# Patient Record
Sex: FEMALE | Race: WHITE | Employment: OTHER | URBAN - METROPOLITAN AREA
[De-identification: names, ages, dates, MRNs, and addresses within clinical notes are randomized per-mention and may not be internally consistent; named-entity substitution may affect disease eponyms.]

---

## 2017-01-03 DIAGNOSIS — E03.9 HYPOTHYROIDISM, UNSPECIFIED TYPE: Primary | ICD-10-CM

## 2017-01-03 RX ORDER — LEVOTHYROXINE SODIUM 112 UG/1
TABLET ORAL
Qty: 1 TAB | Refills: 0
Start: 2017-01-03 | End: 2017-04-12 | Stop reason: SDUPTHER

## 2017-02-13 DIAGNOSIS — E03.9 HYPOTHYROIDISM, UNSPECIFIED TYPE: ICD-10-CM

## 2017-03-31 ENCOUNTER — OFFICE VISIT (OUTPATIENT)
Dept: INTERNAL MEDICINE CLINIC | Age: 82
End: 2017-03-31

## 2017-03-31 DIAGNOSIS — E55.9 VITAMIN D DEFICIENCY: ICD-10-CM

## 2017-03-31 DIAGNOSIS — E03.9 ACQUIRED HYPOTHYROIDISM: ICD-10-CM

## 2017-03-31 DIAGNOSIS — E11.22 CONTROLLED TYPE 2 DIABETES MELLITUS WITH STAGE 4 CHRONIC KIDNEY DISEASE, WITHOUT LONG-TERM CURRENT USE OF INSULIN (HCC): Primary | ICD-10-CM

## 2017-03-31 DIAGNOSIS — E78.00 PURE HYPERCHOLESTEROLEMIA: ICD-10-CM

## 2017-03-31 DIAGNOSIS — N18.4 CONTROLLED TYPE 2 DIABETES MELLITUS WITH STAGE 4 CHRONIC KIDNEY DISEASE, WITHOUT LONG-TERM CURRENT USE OF INSULIN (HCC): Primary | ICD-10-CM

## 2017-03-31 RX ORDER — PRAVASTATIN SODIUM 40 MG/1
40 TABLET ORAL
Qty: 90 TAB | Refills: 3 | Status: SHIPPED | OUTPATIENT
Start: 2017-03-31 | End: 2018-01-01 | Stop reason: SDUPTHER

## 2017-03-31 NOTE — MR AVS SNAPSHOT
Visit Information Date & Time Provider Department Dept. Phone Encounter #  
 3/31/2017  2:15 PM MD Julio Cesar [facility] [phone] Upcoming Health Maintenance Date Due  
 EYE EXAM RETINAL OR DILATED Q1 11/19/2016 MEDICARE YEARLY EXAM 12/22/2016 FOOT EXAM Q1 12/22/2016 HEMOGLOBIN A1C Q6M 6/28/2017 MICROALBUMIN Q1 7/13/2017 LIPID PANEL Q1 7/13/2017 GLAUCOMA SCREENING Q2Y 11/19/2017 DTaP/Tdap/Td series (2 - Td) 12/4/2024 Allergies as of 3/31/2017  Review Complete On: 3/31/2017 By: Lila Julian Severity Noted Reaction Type Reactions Lipitor [Atorvastatin]  03/05/2013   Side Effect Myalgia Current Immunizations  Reviewed on 12/22/2015 Name Date Influenza High Dose Vaccine PF 10/7/2016, 10/1/2015 Influenza Vaccine 10/20/2014 Influenza Vaccine Split 9/27/2012 Pneumococcal Conjugate (PCV-13) 4/4/2016 Pneumococcal Vaccine (Unspecified Type) 6/5/2011 Tdap 12/4/2014 Not reviewed this visit You Were Diagnosed With   
  
 Codes Comments Controlled type 2 diabetes mellitus with stage 4 chronic kidney disease, without long-term current use of insulin (HCC)    -  Primary ICD-10-CM: E11.22, N18.4 ICD-9-CM: 250.40, 585.4 Vitamin D deficiency     ICD-10-CM: E55.9 ICD-9-CM: 268.9 Pure hypercholesterolemia     ICD-10-CM: E78.00 ICD-9-CM: 272.0 Acquired hypothyroidism     ICD-10-CM: E03.9 ICD-9-CM: 928. 9 Vitals OB Status Smoking Status Postmenopausal Never Smoker Preferred Pharmacy Pharmacy Name Phone [address] Your Updated Medication List  
  
   
This list is accurate as of: 3/31/17  2:57 PM.  Always use your most recent med list. amLODIPine 10 mg tablet Commonly known as:  NORVASC  
take 1 tablet by mouth once daily aspirin delayed-release 81 mg tablet Commonly known as:  Aspirin EC Take 1 Tab by mouth daily. CALCIUM 600 + D(3) 600 mg(1,500mg) -200 unit Tab Generic drug:  calcium-vitamin D Take 1 Tab by mouth two (2) times daily (with meals). CRESTOR 20 mg tablet Generic drug:  rosuvastatin  
take 1 tablet by mouth at bedtime JANUVIA 50 mg tablet Generic drug:  SITagliptin  
take 1 tablet by mouth once daily  
  
 lisinopril 10 mg tablet Commonly known as:  PRINIVIL, ZESTRIL  
take 1 tablet by mouth once daily  
  
 pravastatin 40 mg tablet Commonly known as:  PRAVACHOL Take 1 Tab by mouth nightly. SYNTHROID 112 mcg tablet Generic drug:  levothyroxine  
take 1 tablet by mouth once daily BEFORE BREAKFAST  
  
 VITAMIN D3 2,000 unit Cap capsule Generic drug:  Cholecalciferol (Vitamin D3) Take 1 capsule by mouth daily. Prescriptions Sent to Pharmacy Refills  
 pravastatin (PRAVACHOL) 40 mg tablet 3 Sig: Take 1 Tab by mouth nightly. Class: Normal  
 Pharmacy: [address] [place] [address] #: [phone] Route: Oral  
  
We Performed the Following CBC WITH AUTOMATED DIFF [03069 CPT(R)] HEMOGLOBIN A1C WITH EAG [65543 CPT(R)] METABOLIC PANEL, COMPREHENSIVE [52122 CPT(R)] TSH REFLEX TO T4 [[id number] Custom] VITAMIN D, 25 HYDROXY [id number] CPT(R)] Introducing hospitals & HEALTH SERVICES! [facility] introduces DailyPath patient portal. Now you can access parts of your medical record, email your doctor's office, and request medication refills online. 1. In your internet browser, go to [email or web address] 2. Click on the First Time User? Click Here link in the Sign In box. You will see the New Member Sign Up page. 3. Enter your DailyPath Access Code exactly as it appears below. You will not need to use this code after youve completed the sign-up process.  If you do not sign up before the expiration date, you must request a new code. · WTFast Access Code: [email or web address] Expires: 6/8/2017  4:35 PM 
 
4. Enter the last four digits of your Social Security Number (xxxx) and Date of Birth (mm/dd/yyyy) as indicated and click Submit. You will be taken to the next sign-up page. 5. Create a WTFast ID. This will be your WTFast login ID and cannot be changed, so think of one that is secure and easy to remember. 6. Create a WTFast password. You can change your password at any time. 7. Enter your Password Reset Question and Answer. This can be used at a later time if you forget your password. 8. Enter your e-mail address. You will receive e-mail notification when new information is available in [address] 9. Click Sign Up. You can now view and download portions of your medical record. 10. Click the Download Summary menu link to download a portable copy of your medical information. If you have questions, please visit the Frequently Asked Questions section of the WTFast website. Remember, WTFast is NOT to be used for urgent needs. For medical emergencies, dial 911. Now available from your iPhone and Android! Please provide this summary of care documentation to your next provider. Your primary care clinician is listed as Shreya Hilton. If you have any questions after today's visit, please call [phone].

## 2017-03-31 NOTE — PROGRESS NOTES
Blood Pressure Check       HPI:  Lata Foster is a 80y.o. year old female who is here to establish care. She  had her medical care:  ADM    She reports the following history and medical concerns:      HTN- didn't go see cardiologist    Saw kidney specialist- everything is \"stable\"    Thyroid- increased every day. Cholesterol- doesn't remember LDL but was put on crestor 20 mg        Assessment and Plan        1. Controlled type 2 diabetes mellitus with stage 4 chronic kidney disease, without long-term current use of insulin ([facility]  Recheck E7D  states complications are from DM  - CBC WITH AUTOMATED DIFF  - HEMOGLOBIN A1C WITH EAG  - METABOLIC PANEL, COMPREHENSIVE    2. Vitamin D deficiency  Recheck Vit D.  - VITAMIN D, 25 HYDROXY    3. Pure hypercholesterolemia  Stop crestor because of kidney problems. Change to pravachol 40 mg  - pravastatin (PRAVACHOL) 40 mg tablet; Take 1 Tab by mouth nightly. Dispense: 90 Tab; Refill: 3    4. Acquired hypothyroidism  Recheck TSH  - TSH REFLEX TO T4            There were no vitals taken for this visit. Historical Data    Past Medical History:   Diagnosis Date    Chronic kidney disease (CKD), stage III (moderate)     Dr. 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 Jason Lee Left inguinal hernia     Melanoma ([facility] 2006    Osteopenia 11/25/13    Pneumonia 2005    Valvular heart disease 12/15/2016    grade 2 diastolic dysfunction, moderate to marked annular calcification of mitral valge, moderate calcification of aortic valve with mild-mod stenosis, mild TR, trivial pericardial effusion.     Vitamin D deficiency        Past Surgical History:   Procedure Laterality Date    HX CATARACT REMOVAL Right 2008    HX HYSTERECTOMY  1984    TARIQ/and unilat Ooph    [place] MELANOMA FOLLOW UP COMPLETED  2006    [facility]st Outpatient Encounter Prescriptions as of 3/31/2017   Medication Sig Dispense Refill    pravastatin (PRAVACHOL) 40 mg tablet Take 1 Tab by mouth nightly. 90 Tab 3    lisinopril (PRINIVIL, ZESTRIL) 10 mg tablet take 1 tablet by mouth once daily 30 Tab 6    amLODIPine (NORVASC) 10 mg tablet take 1 tablet by mouth once daily 30 Tab 6    CRESTOR 20 mg tablet take 1 tablet by mouth at bedtime 30 Tab 6    JANUVIA 50 mg tablet take 1 tablet by mouth once daily 30 Tab 6    SYNTHROID 112 mcg tablet take 1 tablet by mouth once daily BEFORE BREAKFAST 1 Tab 0    Cholecalciferol, Vitamin D3, (VITAMIN D3) 2,000 unit cap Take 1 capsule by mouth daily.  aspirin delayed-release (ASPIRIN EC) 81 mg tablet Take 1 Tab by mouth daily. 30 Tab 11    calcium-vitamin D (CALCIUM 600 + D,3,) 600 mg(1,500mg) -200 unit Tab Take 1 Tab by mouth two (2) times daily (with meals). No facility-administered encounter medications on file as of 3/31/2017. Allergies   Allergen Reactions    Lipitor [Atorvastatin] Myalgia        Social History     Social History    Marital status:      Spouse name: N/A    Number of children: N/A    Years of education: N/A     Occupational History    Not on file. Social History Main Topics    Smoking status: Never Smoker    Smokeless tobacco: Never Used    Alcohol use No    Drug use: No    Sexual activity: No     Other Topics Concern    Not on file     Social History Narrative        Review of Systems   Constitutional: Negative for weight loss. Eyes: Negative for blurred vision. Respiratory: Negative for shortness of breath. Cardiovascular: Negative for chest pain. Gastrointestinal: Negative for abdominal pain. Genitourinary: Negative for dysuria and frequency. Skin: Negative for rash. Neurological: Negative for dizziness, weakness and headaches. Physical Exam   Constitutional: She appears well-developed and well-nourished. She is active.   Non-toxic appearance. She does not have a sickly appearance. She does not appear ill. No distress. Eyes: Conjunctivae are normal.   Cardiovascular: Normal rate, regular rhythm, S1 normal, S2 normal, normal heart sounds and normal pulses. Exam reveals no gallop and no friction rub. Pulmonary/Chest: Effort normal and breath sounds normal. No respiratory distress. Abdominal: Soft. Bowel sounds are normal.   Musculoskeletal: She exhibits no edema or deformity. Neurological: She is alert. Skin: Skin is warm and dry. No rash noted. No pallor. Psychiatric: She has a normal mood and affect. Her behavior is normal.      Ortho Exam       Orders Placed This Encounter    TSH REFLEX TO T4    CBC WITH AUTOMATED DIFF    HEMOGLOBIN A1C WITH EAG    METABOLIC PANEL, COMPREHENSIVE    VITAMIN D, 25 HYDROXY    CKD REPORT    DIABETES PATIENT EDUCATION    pravastatin (PRAVACHOL) 40 mg tablet     Sig: Take 1 Tab by mouth nightly. Dispense:  90 Tab     Refill:  3        I have reviewed the patient's medical history in detail and updated the computerized patient record. We had a prolonged discussion about these complex clinical issues and went over the various important aspects to consider. All questions were answered. Advised her to call back or return to office if symptoms do not improve, change in nature, or persist.    She was given an after visit summary or informed of [facility] Access which includes patient instructions, diagnoses, current medications, & vitals. She expressed understanding with the diagnosis and plan.

## 2017-03-31 NOTE — PROGRESS NOTES
Chief Complaint   Patient presents with    Blood Pressure Check     1. Have you been to the ER, urgent care clinic since your last visit? No  Hospitalized since your last visit? No    2. Have you seen or consulted any other health care providers outside of the [address] [place] [address] since your last visit? No  Include any pap smears or colon screening.  No

## 2017-04-01 LAB
25(OH)D3+25(OH)D2 SERPL-MCNC: 48 NG/ML (ref 30–100)
ALBUMIN SERPL-MCNC: 4 G/DL (ref 3.5–4.7)
ALBUMIN/GLOB SERPL: 1.4 {RATIO} (ref 1.2–2.2)
ALP SERPL-CCNC: 44 IU/L (ref 39–117)
ALT SERPL-CCNC: 7 IU/L (ref 0–32)
AST SERPL-CCNC: 10 IU/L (ref 0–40)
BASOPHILS # BLD AUTO: 0.1 X10E3/UL (ref 0–0.2)
BASOPHILS NFR BLD AUTO: 1 %
BILIRUB SERPL-MCNC: 0.2 MG/DL (ref 0–1.2)
BUN SERPL-MCNC: 34 MG/DL (ref 8–27)
BUN/CREAT SERPL: 20 (ref 11–26)
CALCIUM SERPL-MCNC: 9.5 MG/DL (ref 8.7–10.3)
CHLORIDE SERPL-SCNC: 105 MMOL/L (ref 96–106)
CO2 SERPL-SCNC: 22 MMOL/L (ref 18–29)
CREAT SERPL-MCNC: 1.71 MG/DL (ref 0.57–1)
EOSINOPHIL # BLD AUTO: 0.2 X10E3/UL (ref 0–0.4)
EOSINOPHIL NFR BLD AUTO: 2 %
ERYTHROCYTE [DISTWIDTH] IN BLOOD BY AUTOMATED COUNT: 16.2 % (ref 12.3–15.4)
EST. AVERAGE GLUCOSE BLD GHB EST-MCNC: 131 MG/DL
GLOBULIN SER CALC-MCNC: 2.8 G/DL (ref 1.5–4.5)
GLUCOSE SERPL-MCNC: 109 MG/DL (ref 65–99)
HBA1C MFR BLD: 6.2 % (ref 4.8–5.6)
HCT VFR BLD AUTO: 35.1 % (ref 34–46.6)
HGB BLD-MCNC: 11.1 G/DL (ref 11.1–15.9)
IMM GRANULOCYTES # BLD: 0 X10E3/UL (ref 0–0.1)
IMM GRANULOCYTES NFR BLD: 0 %
INTERPRETATION: NORMAL
LYMPHOCYTES # BLD AUTO: 2.4 X10E3/UL (ref 0.7–3.1)
LYMPHOCYTES NFR BLD AUTO: 27 %
Lab: NORMAL
MCH RBC QN AUTO: 27.5 PG (ref 26.6–33)
MCHC RBC AUTO-ENTMCNC: 31.6 G/DL (ref 31.5–35.7)
MCV RBC AUTO: 87 FL (ref 79–97)
MONOCYTES # BLD AUTO: 0.7 X10E3/UL (ref 0.1–0.9)
MONOCYTES NFR BLD AUTO: 8 %
NEUTROPHILS # BLD AUTO: 5.6 X10E3/UL (ref 1.4–7)
NEUTROPHILS NFR BLD AUTO: 62 %
PLATELET # BLD AUTO: 191 X10E3/UL (ref 150–379)
POTASSIUM SERPL-SCNC: 4.8 MMOL/L (ref 3.5–5.2)
PROT SERPL-MCNC: 6.8 G/DL (ref 6–8.5)
RBC # BLD AUTO: 4.03 X10E6/UL (ref 3.77–5.28)
SODIUM SERPL-SCNC: 142 MMOL/L (ref 134–144)
TSH SERPL DL<=0.005 MIU/L-ACNC: 1.32 UIU/ML (ref 0.45–4.5)
WBC # BLD AUTO: 9 X10E3/UL (ref 3.4–10.8)

## 2017-04-03 VITALS
TEMPERATURE: 97 F | SYSTOLIC BLOOD PRESSURE: 126 MMHG | HEART RATE: 71 BPM | WEIGHT: 169 LBS | HEIGHT: 59 IN | DIASTOLIC BLOOD PRESSURE: 80 MMHG | OXYGEN SATURATION: 98 % | RESPIRATION RATE: 16 BRPM | BODY MASS INDEX: 34.07 KG/M2

## 2017-04-03 NOTE — PROGRESS NOTES
Pt should have a follow up in 4 weeks to recheck labs and then visit 3 days later. Her kidneys are worse from last time. Ask who her kidney doctor is and fax copy to him.

## 2017-04-11 ENCOUNTER — TELEPHONE (OUTPATIENT)
Dept: INTERNAL MEDICINE CLINIC | Age: 82
End: 2017-04-11

## 2017-04-11 NOTE — TELEPHONE ENCOUNTER
Spoke to patient's daughter Sam Gifford  and she will call back to schedule an appointment and give name nephrologist.

## 2017-04-11 NOTE — TELEPHONE ENCOUNTER
----- Message from Chriss Smith MD sent at 4/3/2017  8:10 AM EDT -----  Pt should have a follow up in 4 weeks to recheck labs and then visit 3 days later. Her kidneys are worse from last time. Ask who her kidney doctor is and fax copy to him.

## 2017-04-11 NOTE — PROGRESS NOTES
Spoke to patient daughter Fuad Thompson about lab results.   She stated she will call back to schedule an appointment and give the name and number to nephrologist.

## 2017-08-23 RX ORDER — AMLODIPINE BESYLATE 10 MG/1
TABLET ORAL
Qty: 30 TAB | Refills: 1 | Status: CANCELLED | OUTPATIENT
Start: 2017-08-23

## 2017-08-23 RX ORDER — LISINOPRIL 10 MG/1
TABLET ORAL
Qty: 30 TAB | Refills: 1 | Status: CANCELLED | OUTPATIENT
Start: 2017-08-23

## 2017-08-23 NOTE — TELEPHONE ENCOUNTER
Received fax from [facility] for Rx refill of Lisinopril 10mg, Amlodipine 10mg, and Januvia 50mg tablets.   Rx's pended to Dr. Jeronimo García for approval.

## 2017-08-24 ENCOUNTER — HOSPITAL ENCOUNTER (OUTPATIENT)
Dept: LAB | Age: 82
Discharge: HOME | End: 2017-08-24
Payer: MEDICARE

## 2017-08-24 ENCOUNTER — OFFICE VISIT (OUTPATIENT)
Dept: INTERNAL MEDICINE CLINIC | Age: 82
End: 2017-08-24

## 2017-08-24 VITALS
OXYGEN SATURATION: 98 % | HEART RATE: 64 BPM | TEMPERATURE: 96.8 F | WEIGHT: 161.2 LBS | BODY MASS INDEX: 32.5 KG/M2 | HEIGHT: 59 IN | DIASTOLIC BLOOD PRESSURE: 80 MMHG | SYSTOLIC BLOOD PRESSURE: 136 MMHG | RESPIRATION RATE: 18 BRPM

## 2017-08-24 DIAGNOSIS — E11.9 COMPREHENSIVE DIABETIC FOOT EXAMINATION, TYPE 2 DM, ENCOUNTER FOR (HCC): ICD-10-CM

## 2017-08-24 DIAGNOSIS — E78.00 HYPERCHOLESTEREMIA: ICD-10-CM

## 2017-08-24 DIAGNOSIS — Z71.89 ADVANCED CARE PLANNING/COUNSELING DISCUSSION: ICD-10-CM

## 2017-08-24 DIAGNOSIS — N18.30 CHRONIC RENAL INSUFFICIENCY, STAGE 3 (MODERATE) (HCC): ICD-10-CM

## 2017-08-24 DIAGNOSIS — E11.22 CONTROLLED TYPE 2 DIABETES MELLITUS WITH STAGE 4 CHRONIC KIDNEY DISEASE, WITHOUT LONG-TERM CURRENT USE OF INSULIN (HCC): Primary | ICD-10-CM

## 2017-08-24 DIAGNOSIS — Z00.00 MEDICARE ANNUAL WELLNESS VISIT, SUBSEQUENT: ICD-10-CM

## 2017-08-24 DIAGNOSIS — E03.9 HYPOTHYROIDISM, UNSPECIFIED TYPE: ICD-10-CM

## 2017-08-24 DIAGNOSIS — N18.4 CONTROLLED TYPE 2 DIABETES MELLITUS WITH STAGE 4 CHRONIC KIDNEY DISEASE, WITHOUT LONG-TERM CURRENT USE OF INSULIN (HCC): Primary | ICD-10-CM

## 2017-08-24 DIAGNOSIS — Z13.31 SCREENING FOR DEPRESSION: ICD-10-CM

## 2017-08-24 PROBLEM — N18.9 CHRONIC RENAL INSUFFICIENCY: Status: ACTIVE | Noted: 2017-08-24

## 2017-08-24 PROCEDURE — 80053 COMPREHEN METABOLIC PANEL: CPT

## 2017-08-24 PROCEDURE — 85025 COMPLETE CBC W/AUTO DIFF WBC: CPT

## 2017-08-24 PROCEDURE — 36415 COLL VENOUS BLD VENIPUNCTURE: CPT

## 2017-08-24 PROCEDURE — 87086 URINE CULTURE/COLONY COUNT: CPT

## 2017-08-24 PROCEDURE — 83036 HEMOGLOBIN GLYCOSYLATED A1C: CPT

## 2017-08-24 PROCEDURE — 80061 LIPID PANEL: CPT

## 2017-08-24 PROCEDURE — 82043 UR ALBUMIN QUANTITATIVE: CPT

## 2017-08-24 PROCEDURE — 87088 URINE BACTERIA CULTURE: CPT

## 2017-08-24 PROCEDURE — 81001 URINALYSIS AUTO W/SCOPE: CPT

## 2017-08-24 RX ORDER — AMLODIPINE BESYLATE 10 MG/1
TABLET ORAL
Qty: 90 TAB | Refills: 6 | Status: SHIPPED | OUTPATIENT
Start: 2017-08-24 | End: 2018-01-01 | Stop reason: SDUPTHER

## 2017-08-24 RX ORDER — LISINOPRIL 10 MG/1
TABLET ORAL
Qty: 90 TAB | Refills: 6 | Status: SHIPPED | OUTPATIENT
Start: 2017-08-24 | End: 2018-01-01 | Stop reason: SDUPTHER

## 2017-08-24 NOTE — PROGRESS NOTES
Hypertension and Diabetes       HPI:  Seble Araiza is a 80y.o. year old female who is here for a follow up visit. She was last seen by me on 3/31/2017. She reports the following:    Feels pretty good. 112 usually this am.  Watching diet. CRI- sees Dr. Karthik Michel fax [phone]  Getting a kidney function test on 8/31    Very active. Switched crestor to pravachol 40 mg because of kidneys      BP a little high when coming to doctors    No leg swelling. Weight stable        Assessment and Plan        1. Controlled type 2 diabetes mellitus with stage 4 chronic kidney disease, without long-term current use of insulin (HCC)  Blood sugars seem to be in good control. Lab Results   Component Value Date/Time    Hemoglobin A1c 6.2 03/31/2017 03:03 PM    Hemoglobin A1c, External 6.8 12/20/2014     No lows. - CBC WITH AUTOMATED DIFF  - LIPID PANEL  - METABOLIC PANEL, COMPREHENSIVE  - HEMOGLOBIN A1C WITH EAG  - MICROALBUMIN, UR, RAND W/ MICROALBUMIN/CREA RATIO  - UA/M W/RFLX CULTURE, ROUTINE    2. Hypercholesteremia  The nature of cardiac risk has been fully discussed with this patient. I have made her aware of her LDL target goal given her cardiovascular risk analysis. I have discussed the appropriate diet. The need for lifelong compliance in order to reduce risk is stressed. A regular exercise program is recommended to help achieve and maintain normal body weight, fitness and improve lipid balance. The risks and benefits of medications were discussed. Advised to have Omega 3 Fish Oil 1000 mg as a supplement. Last cholesterol labs reviewed with patient. Patient made aware to get liver checked every 6 months. Continue with  pravachol 40 mg as we stopped crestor      5. Hypothyroidism, unspecified type  The nature of cardiac risk has been fully discussed with this patient. I have made her aware of her LDL target goal given her cardiovascular risk analysis. I have discussed the appropriate diet.  The need for lifelong compliance in order to reduce risk is stressed. A regular exercise program is recommended to help achieve and maintain normal body weight, fitness and improve lipid balance. The risks and benefits of medications were discussed. Advised to have Omega 3 Fish Oil 1000 mg as a supplement. Last cholesterol labs reviewed with patient. Patient made aware to get liver checked every 6 months. Continue with      6. Chronic renal insufficiency, stage 3 (moderate)  Will recheck Cr. No Nsaids. 7. Medicare annual wellness visit, subsequent  Completed. Pt is full code. 8. Advanced care planning/counseling discussion  Discussed advanced directive. 9. Screening for depression  Denies depression. 10. Comprehensive diabetic foot examination, type 2 DM, encounter for ([facility]  No ulcers or calluses. Visit Vitals    /80 (BP 1 Location: Left arm, BP Patient Position: Sitting)    Pulse 64    Temp 96.8 °F (36 °C) (Oral)    Resp 18    Ht 4' 11\" (1.499 m)    Wt 161 lb 3.2 oz (73.1 kg)    SpO2 98%    BMI 32.56 kg/m2       Historical Data    Past Medical History:   Diagnosis Date    Chronic kidney disease (CKD), stage III (moderate)     Dr. 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 Bubba Alexander Left inguinal hernia     Melanoma ([facility] 2006    Osteopenia 11/25/13    Pneumonia 2005    Valvular heart disease 12/15/2016    grade 2 diastolic dysfunction, moderate to marked annular calcification of mitral valge, moderate calcification of aortic valve with mild-mod stenosis, mild TR, trivial pericardial effusion.     Vitamin D deficiency        Past Surgical History:   Procedure Laterality Date    HX CATARACT REMOVAL Right 2008    HX HYSTERECTOMY  1984    TARIQ/and unilat Ooph    [place] MELANOMA FOLLOW UP COMPLETED  2006    [facility]st Outpatient Encounter Prescriptions as of 8/24/2017   Medication Sig Dispense Refill    SYNTHROID 112 mcg tablet take 1 tablet by mouth once daily BEFORE BREAKFAST 90 Tab 3    pravastatin (PRAVACHOL) 40 mg tablet Take 1 Tab by mouth nightly. 90 Tab 3    lisinopril (PRINIVIL, ZESTRIL) 10 mg tablet take 1 tablet by mouth once daily 30 Tab 6    amLODIPine (NORVASC) 10 mg tablet take 1 tablet by mouth once daily 30 Tab 6    JANUVIA 50 mg tablet take 1 tablet by mouth once daily 30 Tab 6    Cholecalciferol, Vitamin D3, (VITAMIN D3) 2,000 unit cap Take 1 capsule by mouth daily.  aspirin delayed-release (ASPIRIN EC) 81 mg tablet Take 1 Tab by mouth daily. 30 Tab 11    calcium-vitamin D (CALCIUM 600 + D,3,) 600 mg(1,500mg) -200 unit Tab Take 1 Tab by mouth two (2) times daily (with meals). No facility-administered encounter medications on file as of 8/24/2017. Allergies   Allergen Reactions    Lipitor [Atorvastatin] Myalgia        Social History     Social History    Marital status:      Spouse name: N/A    Number of children: N/A    Years of education: N/A     Occupational History    Not on file. Social History Main Topics    Smoking status: Never Smoker    Smokeless tobacco: Never Used    Alcohol use No    Drug use: No    Sexual activity: No     Other Topics Concern    Not on file     Social History Narrative        family history includes Diabetes in her mother; Heart Disease in her brother and mother; Hypertension in her father and mother; Lupus in her sister; Stroke in her father; Thyroid Disease in her maternal grandfather, sister, and sister. Review of Systems   Constitutional: Negative for weight loss. Eyes: Negative for blurred vision. Respiratory: Negative for shortness of breath. Cardiovascular: Negative for chest pain. Gastrointestinal: Negative for abdominal pain. Genitourinary: Negative for dysuria and frequency. Skin: Negative for rash. Neurological: Negative for dizziness, focal weakness, weakness and headaches. Endo/Heme/Allergies: Negative for environmental allergies. Does not bruise/bleed easily. Physical Exam   Constitutional: She appears well-developed and well-nourished. She is active. Non-toxic appearance. She does not have a sickly appearance. She does not appear ill. No distress. Eyes: Conjunctivae are normal. Right eye exhibits no discharge. Cardiovascular: Normal rate, regular rhythm, S1 normal, S2 normal, intact distal pulses and normal pulses. Exam reveals no gallop and no friction rub. Murmur heard. Pulses:       Dorsalis pedis pulses are 3+ on the right side, and 3+ on the left side. Posterior tibial pulses are 3+ on the right side, and 3+ on the left side. Pulmonary/Chest: Effort normal and breath sounds normal. No respiratory distress. Abdominal: Soft. Bowel sounds are normal.   Musculoskeletal: She exhibits no edema or deformity. Right foot: There is no deformity. Left foot: There is no deformity. Feet:   Right Foot:   Protective Sensation: 10 sites sensed. Skin Integrity: Positive for warmth and dry skin. Negative for ulcer, blister, skin breakdown, erythema or callus. Left Foot:   Protective Sensation: 10 sites sensed. Skin Integrity: Positive for erythema, warmth and dry skin. Negative for ulcer, blister, skin breakdown or callus. Neurological: She is alert. Skin: Skin is warm and dry. No rash noted. No pallor. Psychiatric: She has a normal mood and affect. Her behavior is normal.   Vitals reviewed. Ortho Exam      No orders of the defined types were placed in this encounter. I have reviewed the patient's medical history in detail and updated the computerized patient record. We had a prolonged discussion about these complex clinical issues and went over the various important aspects to consider. All questions were answered.      Advised her to call back or return to office if symptoms do not improve, change in nature, or persist.    She was given an after visit summary or informed of [employer] Access which includes patient instructions, diagnoses, current medications, & vitals. She expressed understanding with the diagnosis and plan. Sean Adams is a 80 y.o. female and presents for annual Medicare Wellness Visit. Assessment of cognitive impairment: Alert and oriented x 3. Patient denies concerns about cognitive impairment. Problem List: Reviewed with patient and discussed risk factors. Patient Active Problem List   Diagnosis Code    Hypercholesterolemia E78.00    Hypothyroid E03.9    Hx of cataract Z86.69    Hx of melanoma excision Z98.890, Z85.820    Osteopenia M85.80    Screening for osteoporosis Z13.820    Environmental allergies Z91.09    Vitamin D deficiency E55.9    Left inguinal hernia K40.90    Diabetic eye exam ([facility] E11.9, Z01.00    Essential hypertension I10    Intraepidermal squamous cell carcinoma, Noe's type D04.9    Controlled type 2 diabetes mellitus with stage 4 chronic kidney disease, without long-term current use of insulin (HCC) E11.22, N18.4    Valvular heart disease I38       Current medical providers:  Patient Care Team:  Mira Castillo MD as PCP - General (Internal Medicine)        End of Life Planning: This was discussed with her today and she has an advanced directive - a copy HAS NOT been provided. Reviewed DNR/DNI and patient is not interested. Depression Screen: Reviewed PQH2 done by nurse. PHQ over the last two weeks 3/31/2017   Little interest or pleasure in doing things Not at all   Feeling down, depressed or hopeless Not at all   Total Score PHQ 2 0       Fall Risk:   Fall Risk Assessment, last 12 mths 3/31/2017   Able to walk? Yes   Fall in past 12 months? No       Home Safety - discussion completed. No issues found. Hearing Loss:  Gets hearing checked.     Activities of Daily Living:  Self-care. Requires assistance with: no ADLs    Adult Nutrition Screen:  No risk factors noted. Health Maintenance- Reviewed    AAA Screening:  Glaucoma Screening: gets eyes checked. Health Maintenance Due   Topic Date Due    MEDICARE YEARLY EXAM  12/22/2016    FOOT EXAM Q1  12/22/2016    MICROALBUMIN Q1  07/13/2017    LIPID PANEL Q1  07/13/2017    INFLUENZA AGE 9 TO ADULT  08/01/2017        Health Maintenance reviewed -  Plan:      Orders Placed This Encounter    CBC WITH AUTOMATED DIFF    LIPID PANEL    METABOLIC PANEL, COMPREHENSIVE    HEMOGLOBIN A1C WITH EAG    lisinopril (PRINIVIL, ZESTRIL) 10 mg tablet    amLODIPine (NORVASC) 10 mg tablet    SITagliptin (JANUVIA) 50 mg tablet           Plan:    Diagnoses and all orders for this visit:    1. Controlled type 2 diabetes mellitus with stage 4 chronic kidney disease, without long-term current use of insulin ([facility])  -     CBC WITH AUTOMATED DIFF  -     LIPID PANEL  -     METABOLIC PANEL, COMPREHENSIVE  -     HEMOGLOBIN A1C WITH EAG    Other orders  -     lisinopril (PRINIVIL, ZESTRIL) 10 mg tablet; take 1 tablet by mouth once daily  -     amLODIPine (NORVASC) 10 mg tablet; take 1 tablet by mouth once daily  -     SITagliptin (JANUVIA) 50 mg tablet; take 1 tablet by mouth once daily      Orders Placed This Encounter    CBC WITH AUTOMATED DIFF    LIPID PANEL    METABOLIC PANEL, COMPREHENSIVE    HEMOGLOBIN A1C WITH EAG    lisinopril (PRINIVIL, ZESTRIL) 10 mg tablet    amLODIPine (NORVASC) 10 mg tablet    SITagliptin (JANUVIA) 50 mg tablet         Follow-up Disposition: Not on File  Reviewed with patient the treatment plan, goals of treatment plan, and limitations of treatment plan, to include the potential for side effects from medications and procedures. If side effects occur, it is the responsibility of the patient to inform the clinic so that a change in the treatment plan can be made in a safe manner.  The patient is advised that stopping prescribed medication may cause an increase in symptoms and possible medication withdrawal symptoms. The patient is informed an emergency room evaluation may be necessary if this occurs. Patient verbalized understanding and is in agreement with treatment plan as outlined above. All questions answered.

## 2017-08-24 NOTE — MR AVS SNAPSHOT
Visit Information Date & Time Provider Department Dept. Phone Encounter #  
 8/24/2017 11:30 AM Irwin Patel MD [place] [id number] [facility] [phone] [id number] Follow-up Instructions Return in about 6 months (around 2/24/2018). Upcoming Health Maintenance Date Due  
 MEDICARE YEARLY EXAM 12/22/2016 FOOT EXAM Q1 12/22/2016 MICROALBUMIN Q1 7/13/2017 LIPID PANEL Q1 7/13/2017 INFLUENZA AGE 9 TO ADULT 8/1/2017 HEMOGLOBIN A1C Q6M 9/30/2017 EYE EXAM RETINAL OR DILATED Q1 5/18/2018 GLAUCOMA SCREENING Q2Y 5/18/2019 DTaP/Tdap/Td series (2 - Td) 12/4/2024 Allergies as of 8/24/2017  Review Complete On: 8/24/2017 By: Monica Zamora LPN Severity Noted Reaction Type Reactions Lipitor [Atorvastatin]  03/05/2013   Side Effect Myalgia Current Immunizations  Reviewed on 12/22/2015 Name Date Influenza High Dose Vaccine PF 10/7/2016, 10/1/2015 Influenza Vaccine 10/20/2014 Influenza Vaccine Split 9/27/2012 Pneumococcal Conjugate (PCV-13) 4/4/2016 Pneumococcal Vaccine (Unspecified Type) 6/5/2011 Tdap 12/4/2014 Not reviewed this visit You Were Diagnosed With   
  
 Codes Comments Controlled type 2 diabetes mellitus with stage 4 chronic kidney disease, without long-term current use of insulin (HCC)    -  Primary ICD-10-CM: E11.22, N18.4 ICD-9-CM: 250.40, 585.4 Vitals BP Pulse Temp Resp Height(growth percentile) Weight(growth percentile) 160/80 (BP 1 Location: Left arm, BP Patient Position: Sitting) 64 96.8 °F (36 °C) (Oral) 18 4' 11\" (1.499 m) 161 lb 3.2 oz (73.1 kg) SpO2 BMI OB Status Smoking Status 98% 32.56 kg/m2 Postmenopausal Never Smoker Vitals History BMI and BSA Data Body Mass Index Body Surface Area 32.56 kg/m 2 1.74 m 2 Preferred Pharmacy Pharmacy Name Phone [address] Your Updated Medication List  
  
   
This list is accurate as of: 8/24/17 12:31 PM.  Always use your most recent med list. amLODIPine 10 mg tablet Commonly known as:  NORVASC  
take 1 tablet by mouth once daily  
  
 aspirin delayed-release 81 mg tablet Commonly known as:  Aspirin EC Take 1 Tab by mouth daily. CALCIUM 600 + D(3) 600 mg(1,500mg) -200 unit Tab Generic drug:  calcium-vitamin D Take 1 Tab by mouth two (2) times daily (with meals). lisinopril 10 mg tablet Commonly known as:  PRINIVIL, ZESTRIL  
take 1 tablet by mouth once daily  
  
 pravastatin 40 mg tablet Commonly known as:  PRAVACHOL Take 1 Tab by mouth nightly. SITagliptin 50 mg tablet Commonly known as:  JANUVIA  
take 1 tablet by mouth once daily SYNTHROID 112 mcg tablet Generic drug:  levothyroxine  
take 1 tablet by mouth once daily BEFORE BREAKFAST  
  
 VITAMIN D3 2,000 unit Cap capsule Generic drug:  Cholecalciferol (Vitamin D3) Take 1 capsule by mouth daily. Prescriptions Sent to Pharmacy Refills  
 lisinopril (PRINIVIL, ZESTRIL) 10 mg tablet 6 Sig: take 1 tablet by mouth once daily Class: Normal  
 Pharmacy: [address], [address] [place] [address] Ph #: [phone]  
 amLODIPine (NORVASC) 10 mg tablet 6 Sig: take 1 tablet by mouth once daily Class: Normal  
 Pharmacy: [address] [place] [address] Ph #: [phone] SITagliptin (JANUVIA) 50 mg tablet 6 Sig: take 1 tablet by mouth once daily Class: Normal  
 Pharmacy: [address] [place] [address] Ph #: [phone] We Performed the Following CBC WITH AUTOMATED DIFF [36952 CPT(R)] HEMOGLOBIN A1C WITH EAG [62187 CPT(R)] LIPID PANEL [77290 CPT(R)] METABOLIC PANEL, COMPREHENSIVE [75524 CPT(R)] MICROALBUMIN, UR, RAND W/ MICROALBUMIN/CREA RATIO [id number] CPT(R)] UA/M W/RFLX CULTURE, ROUTINE [[id number] Custom] Follow-up Instructions Return in about 6 months (around 2/24/2018). To-Do List   
 08/31/2017 4:00 PM  
  Appointment with [address] at [facility] ([phone]) GENERAL INSTRUCTIONS 1. Bring any non [facility] facility films/reports pertaining to the area being studied with you on the day of appointment. 2. A written order with a valid diagnosis and Physicians signature is required for all scheduled tests. 3. Check in at registration 30 minutes before your appointment time unless you were instructed to do otherwise. Introducing [facility]! Pérez Prescott introduces [employer] patient portal. Now you can access parts of your medical record, email your doctor's office, and request medication refills online. 1. In your internet browser, go to [email or web address] 2. Click on the First Time User? Click Here link in the Sign In box. You will see the New Member Sign Up page. 3. Enter your [employer] Access Code exactly as it appears below. You will not need to use this code after youve completed the sign-up process. If you do not sign up before the expiration date, you must request a new code. · [employer] Access Code: [email or web address] Expires: 11/21/2017 10:48 AM 
 
4. Enter the last four digits of your Social Security Number (xxxx) and Date of Birth (mm/dd/yyyy) as indicated and click Submit. You will be taken to the next sign-up page. 5. Create a Sendiat ID. This will be your [employer] login ID and cannot be changed, so think of one that is secure and easy to remember. 6. Create a [employer] password. You can change your password at any time. 7. Enter your Password Reset Question and Answer. This can be used at a later time if you forget your password. 8. Enter your e-mail address. You will receive e-mail notification when new information is available in [address] 9. Click Sign Up. You can now view and download portions of your medical record. 10. Click the Download Summary menu link to download a portable copy of your medical information. If you have questions, please visit the Frequently Asked Questions section of the [employer] website. Remember, [employer] is NOT to be used for urgent needs. For medical emergencies, dial 911. Now available from your iPhone and Android! Please provide this summary of care documentation to your next provider. Your primary care clinician is listed as Maritza Zavala. If you have any questions after today's visit, please call [phone].

## 2017-08-24 NOTE — PROGRESS NOTES
Chief Complaint   Patient presents with    Hypertension     Reviewed record in preparation for visit and have obtained necessary documentation. Identified pt with two pt identifiers(name and ). Health Maintenance Due   Topic    MEDICARE YEARLY EXAM     FOOT EXAM Q1     MICROALBUMIN Q1     LIPID PANEL Q1     INFLUENZA AGE 9 TO ADULT        Chief Complaint   Patient presents with    Hypertension    Diabetes            Wt Readings from Last 3 Encounters:   17 161 lb 3.2 oz (73.1 kg)   17 169 lb (76.7 kg)   16 170 lb (77.1 kg)     Temp Readings from Last 3 Encounters:   17 96.8 °F (36 °C) (Oral)   17 97 °F (36.1 °C) (Oral)   16 97.7 °F (36.5 °C) (Oral)     BP Readings from Last 3 Encounters:   17 160/80   17 126/80   16 160/80     Pulse Readings from Last 3 Encounters:   17 64   17 71   16 63           Learning Assessment:  :     Learning Assessment 2015   PRIMARY LEARNER Patient Patient   HIGHEST LEVEL OF EDUCATION - PRIMARY LEARNER  GRADUATED HIGH SCHOOL OR GED -   BARRIERS PRIMARY LEARNER NONE -   CO-LEARNER CAREGIVER No -   PRIMARY LANGUAGE ENGLISH ENGLISH    NEED No -   LEARNER PREFERENCE PRIMARY READING LISTENING   LEARNING SPECIAL TOPICS no -   ANSWERED BY patient patient   RELATIONSHIP SELF SELF   ASSESSMENT COMMENT n/a  -       Depression Screening:  :     PHQ over the last two weeks 3/31/2017   Little interest or pleasure in doing things Not at all   Feeling down, depressed or hopeless Not at all   Total Score PHQ 2 0       Fall Risk Assessment:  :     Fall Risk Assessment, last 12 mths 3/31/2017   Able to walk? Yes   Fall in past 12 months? No       Abuse Screening:  :     Abuse Screening Questionnaire 2015   Do you ever feel afraid of your partner? N N   Are you in a relationship with someone who physically or mentally threatens you? N N   Is it safe for you to go home?  Akil Yen Coordination of Care Questionnaire:  :     1) Have you been to an emergency room, urgent care clinic since your last visit? no   Hospitalized since your last visit? no             2) Have you seen or consulted any other health care providers outside of [address] [place] [address] since your last visit? no  (Include any pap smears or colon screenings in this section.)    3) Do you have an Advance Directive on file? no    4) Are you interested in receiving information on Advance Directives? NO      Patient is accompanied by son I have received verbal consent from David Jerry to discuss any/all medical information while they are present in the room. Reviewed record  In preparation for visit and have obtained necessary documentation.

## 2017-08-27 LAB
ALBUMIN SERPL-MCNC: 4.1 G/DL (ref 3.5–4.7)
ALBUMIN/CREAT UR: 14.6 MG/G CREAT (ref 0–30)
ALBUMIN/GLOB SERPL: 1.3 {RATIO} (ref 1.2–2.2)
ALP SERPL-CCNC: 49 IU/L (ref 39–117)
ALT SERPL-CCNC: 11 IU/L (ref 0–32)
APPEARANCE UR: CLEAR
AST SERPL-CCNC: 17 IU/L (ref 0–40)
BACTERIA #/AREA URNS HPF: ABNORMAL /[HPF]
BACTERIA UR CULT: NORMAL
BACTERIA UR CULT: NORMAL
BASOPHILS # BLD AUTO: 0.1 X10E3/UL (ref 0–0.2)
BASOPHILS NFR BLD AUTO: 1 %
BILIRUB SERPL-MCNC: 0.3 MG/DL (ref 0–1.2)
BILIRUB UR QL STRIP: NEGATIVE
BUN SERPL-MCNC: 31 MG/DL (ref 8–27)
BUN/CREAT SERPL: 18 (ref 12–28)
CALCIUM SERPL-MCNC: 9.4 MG/DL (ref 8.7–10.3)
CASTS URNS MICRO: ABNORMAL
CASTS URNS QL MICRO: PRESENT /LPF
CHLORIDE SERPL-SCNC: 103 MMOL/L (ref 96–106)
CHOLEST SERPL-MCNC: 176 MG/DL (ref 100–199)
CO2 SERPL-SCNC: 21 MMOL/L (ref 18–29)
COLOR UR: YELLOW
CREAT SERPL-MCNC: 1.72 MG/DL (ref 0.57–1)
CREAT UR-MCNC: 116.1 MG/DL
EOSINOPHIL # BLD AUTO: 0.2 X10E3/UL (ref 0–0.4)
EOSINOPHIL NFR BLD AUTO: 2 %
EPI CELLS #/AREA URNS HPF: ABNORMAL /HPF
ERYTHROCYTE [DISTWIDTH] IN BLOOD BY AUTOMATED COUNT: 18.2 % (ref 12.3–15.4)
EST. AVERAGE GLUCOSE BLD GHB EST-MCNC: 128 MG/DL
GLOBULIN SER CALC-MCNC: 3.2 G/DL (ref 1.5–4.5)
GLUCOSE SERPL-MCNC: 116 MG/DL (ref 65–99)
GLUCOSE UR QL: NEGATIVE
HBA1C MFR BLD: 6.1 % (ref 4.8–5.6)
HCT VFR BLD AUTO: 34.6 % (ref 34–46.6)
HDLC SERPL-MCNC: 56 MG/DL
HGB BLD-MCNC: 10.5 G/DL (ref 11.1–15.9)
HGB UR QL STRIP: NEGATIVE
IMM GRANULOCYTES # BLD: 0.1 X10E3/UL (ref 0–0.1)
IMM GRANULOCYTES NFR BLD: 1 %
INTERPRETATION, 910389: NORMAL
INTERPRETATION: NORMAL
KETONES UR QL STRIP: NEGATIVE
LDLC SERPL CALC-MCNC: 79 MG/DL (ref 0–99)
LEUKOCYTE ESTERASE UR QL STRIP: ABNORMAL
LYMPHOCYTES # BLD AUTO: 2.5 X10E3/UL (ref 0.7–3.1)
LYMPHOCYTES NFR BLD AUTO: 25 %
Lab: NORMAL
MCH RBC QN AUTO: 24.6 PG (ref 26.6–33)
MCHC RBC AUTO-ENTMCNC: 30.3 G/DL (ref 31.5–35.7)
MCV RBC AUTO: 81 FL (ref 79–97)
MICRO URNS: ABNORMAL
MICROALBUMIN UR-MCNC: 17 UG/ML
MONOCYTES # BLD AUTO: 0.9 X10E3/UL (ref 0.1–0.9)
MONOCYTES NFR BLD AUTO: 9 %
MUCOUS THREADS URNS QL MICRO: PRESENT
NEUTROPHILS # BLD AUTO: 6.2 X10E3/UL (ref 1.4–7)
NEUTROPHILS NFR BLD AUTO: 62 %
NITRITE UR QL STRIP: NEGATIVE
PDF IMAGE, 910387: NORMAL
PH UR STRIP: 6 [PH] (ref 5–7.5)
PLATELET # BLD AUTO: 201 X10E3/UL (ref 150–379)
POTASSIUM SERPL-SCNC: 4.7 MMOL/L (ref 3.5–5.2)
PROT SERPL-MCNC: 7.3 G/DL (ref 6–8.5)
PROT UR QL STRIP: NEGATIVE
RBC # BLD AUTO: 4.27 X10E6/UL (ref 3.77–5.28)
RBC #/AREA URNS HPF: ABNORMAL /HPF
SODIUM SERPL-SCNC: 142 MMOL/L (ref 134–144)
SP GR UR: 1.02 (ref 1–1.03)
TRIGL SERPL-MCNC: 205 MG/DL (ref 0–149)
URINALYSIS REFLEX, 377202: ABNORMAL
UROBILINOGEN UR STRIP-MCNC: 0.2 MG/DL (ref 0.2–1)
VLDLC SERPL CALC-MCNC: 41 MG/DL (ref 5–40)
WBC # BLD AUTO: 9.9 X10E3/UL (ref 3.4–10.8)
WBC #/AREA URNS HPF: ABNORMAL /HPF

## 2017-08-31 ENCOUNTER — HOSPITAL ENCOUNTER (OUTPATIENT)
Dept: ULTRASOUND IMAGING | Age: 82
Discharge: HOME | End: 2017-08-31
Attending: INTERNAL MEDICINE
Payer: MEDICARE

## 2017-08-31 DIAGNOSIS — N18.4 CHRONIC KIDNEY DISEASE, STAGE IV (SEVERE) (HCC): ICD-10-CM

## 2017-08-31 PROCEDURE — 76770 US EXAM ABDO BACK WALL COMP: CPT

## 2017-09-12 ENCOUNTER — APPOINTMENT (OUTPATIENT)
Age: 82
Setting detail: DERMATOLOGY
End: 2017-09-12

## 2017-09-12 DIAGNOSIS — Z85.820 PERSONAL HISTORY OF MALIGNANT MELANOMA OF SKIN: ICD-10-CM

## 2017-09-12 DIAGNOSIS — L82.1 OTHER SEBORRHEIC KERATOSIS: ICD-10-CM

## 2017-09-12 PROBLEM — D48.5 NEOPLASM OF UNCERTAIN BEHAVIOR OF SKIN: Status: ACTIVE | Noted: 2017-09-12

## 2017-09-12 PROCEDURE — 11100: CPT

## 2017-09-12 PROCEDURE — OTHER COUNSELING: OTHER

## 2017-09-12 PROCEDURE — 11101: CPT

## 2017-09-12 PROCEDURE — OTHER REASSURANCE: OTHER

## 2017-09-12 PROCEDURE — 99213 OFFICE O/P EST LOW 20 MIN: CPT | Mod: 25

## 2017-09-12 PROCEDURE — OTHER BIOPSY BY PUNCH METHOD: OTHER

## 2017-09-12 NOTE — PROCEDURE: BIOPSY BY PUNCH METHOD
Detail Level: Detailed
Wound Care: Bacitracin
Billing Type: Third-Party Bill
X Depth Of Punch In Cm (Optional): 0
Anesthesia Type: 1% lidocaine with epinephrine and a 1:10 solution of 8.4% sodium bicarbonate
Anesthesia Volume In Cc (Will Not Render If 0): 0.5
Home Suture Removal Text: Patient was provided a home suture removal kit and will remove their sutures at home.  If they have any questions or difficulties they will call the office.
Notification Instructions: Patient will be notified of biopsy results. However, patient instructed to call the office if not contacted within 2 weeks.
Consent: Written consent was obtained and risks were reviewed including but not limited to scarring, infection, bleeding, scabbing, incomplete removal, nerve damage and allergy to anesthesia.
Suture Removal: 10 days
Epidermal Sutures: 4-0 Nylon
Patient Will Remove Sutures At Home?: No
Punch Size In Mm: 3
Dressing: bandage
Post-Care Instructions: I reviewed with the patient in detail post-care instructions. Patient is to keep the biopsy site dry overnight, and then apply bacitracin twice daily until healed. Patient may apply hydrogen peroxide and half water twice daily
Biopsy Type: H and E
Hemostasis: None

## 2017-09-21 ENCOUNTER — APPOINTMENT (OUTPATIENT)
Age: 82
Setting detail: DERMATOLOGY
End: 2017-09-21

## 2017-09-21 DIAGNOSIS — Z48.02 ENCOUNTER FOR REMOVAL OF SUTURES: ICD-10-CM

## 2017-09-21 DIAGNOSIS — L57.0 ACTINIC KERATOSIS: ICD-10-CM

## 2017-09-21 PROBLEM — C44.329 SQUAMOUS CELL CARCINOMA OF SKIN OF OTHER PARTS OF FACE: Status: ACTIVE | Noted: 2017-09-21

## 2017-09-21 PROCEDURE — 17003 DESTRUCT PREMALG LES 2-14: CPT

## 2017-09-21 PROCEDURE — OTHER LIQUID NITROGEN: OTHER

## 2017-09-21 PROCEDURE — 17000 DESTRUCT PREMALG LESION: CPT

## 2017-09-21 PROCEDURE — OTHER COUNSELING: OTHER

## 2017-09-21 PROCEDURE — OTHER SUTURE REMOVAL (GLOBAL PERIOD): OTHER

## 2017-09-21 PROCEDURE — 99024 POSTOP FOLLOW-UP VISIT: CPT

## 2017-09-21 PROCEDURE — OTHER PATHOLOGY DISCUSSION: OTHER

## 2017-09-21 ASSESSMENT — LOCATION SIMPLE DESCRIPTION DERM
LOCATION SIMPLE: LEFT FOREHEAD
LOCATION SIMPLE: RIGHT FOREHEAD

## 2017-09-21 ASSESSMENT — LOCATION DETAILED DESCRIPTION DERM
LOCATION DETAILED: RIGHT FOREHEAD
LOCATION DETAILED: LEFT INFERIOR MEDIAL FOREHEAD
LOCATION DETAILED: LEFT LATERAL FOREHEAD

## 2017-09-21 ASSESSMENT — LOCATION ZONE DERM: LOCATION ZONE: FACE

## 2017-09-21 NOTE — PROCEDURE: LIQUID NITROGEN
Consent: The patient's consent was obtained including but not limited to risks of crusting, scabbing, blistering, scarring, darker or lighter pigmentary change, recurrence, incomplete removal and infection.
Post-Care Instructions: I reviewed with the patient in detail post-care instructions. Patient is to wear sunprotection, and avoid picking at any of the treated lesions. Pt may apply Vaseline to crusted or scabbing areas.
Detail Level: Detailed
Render Post-Care Instructions In Note?: no
Number Of Freeze-Thaw Cycles: 1 freeze-thaw cycle
Duration Of Freeze Thaw-Cycle (Seconds): 0

## 2017-09-21 NOTE — PROCEDURE: SUTURE REMOVAL (GLOBAL PERIOD)
Detail Level: Detailed
Add 28970 Cpt? (Important Note: In 2017 The Use Of 23141 Is Being Tracked By Cms To Determine Future Global Period Reimbursement For Global Periods): yes

## 2017-10-12 ENCOUNTER — APPOINTMENT (OUTPATIENT)
Age: 82
Setting detail: DERMATOLOGY
End: 2017-10-12

## 2017-10-12 PROBLEM — C44.329 SQUAMOUS CELL CARCINOMA OF SKIN OF OTHER PARTS OF FACE: Status: ACTIVE | Noted: 2017-10-12

## 2017-10-12 PROCEDURE — 13131 CMPLX RPR F/C/C/M/N/AX/G/H/F: CPT

## 2017-10-12 PROCEDURE — OTHER REPAIR NOTE: OTHER

## 2017-10-12 PROCEDURE — 17311 MOHS 1 STAGE H/N/HF/G: CPT

## 2017-10-12 PROCEDURE — OTHER MOHS SURGERY: OTHER

## 2017-10-20 ENCOUNTER — APPOINTMENT (OUTPATIENT)
Age: 82
Setting detail: DERMATOLOGY
End: 2017-10-20

## 2017-10-20 DIAGNOSIS — Z48.02 ENCOUNTER FOR REMOVAL OF SUTURES: ICD-10-CM

## 2017-10-20 PROCEDURE — 99024 POSTOP FOLLOW-UP VISIT: CPT

## 2017-10-20 PROCEDURE — OTHER SUTURE REMOVAL (GLOBAL PERIOD): OTHER

## 2017-10-20 PROCEDURE — OTHER COUNSELING: OTHER

## 2017-10-20 ASSESSMENT — LOCATION DETAILED DESCRIPTION DERM: LOCATION DETAILED: LEFT INFERIOR MEDIAL FOREHEAD

## 2017-10-20 ASSESSMENT — LOCATION ZONE DERM: LOCATION ZONE: FACE

## 2017-10-20 ASSESSMENT — LOCATION SIMPLE DESCRIPTION DERM: LOCATION SIMPLE: LEFT FOREHEAD

## 2017-10-20 NOTE — PROCEDURE: SUTURE REMOVAL (GLOBAL PERIOD)
Add 07407 Cpt? (Important Note: In 2017 The Use Of 11268 Is Being Tracked By Cms To Determine Future Global Period Reimbursement For Global Periods): yes
Detail Level: Detailed

## 2017-12-16 NOTE — PROCEDURE: REPAIR NOTE
Dermal Autograft Text: The defect edges were debeveled with a #15 scalpel blade.  Given the location of the defect, shape of the defect and the proximity to free margins a dermal autograft was deemed most appropriate.  Using a sterile surgical marker, the primary defect shape was transferred to the donor site. The area thus outlined was incised deep to adipose tissue with a #15 scalpel blade.  The harvested graft was then trimmed of adipose and epidermal tissue until only dermis was left.  The skin graft was then placed in the primary defect and oriented appropriately. Attending Only

## 2018-01-01 ENCOUNTER — HOSPITAL ENCOUNTER (OUTPATIENT)
Dept: LAB | Age: 83
Discharge: HOME | End: 2018-02-22
Payer: MEDICARE

## 2018-01-01 ENCOUNTER — TELEPHONE (OUTPATIENT)
Dept: INTERNAL MEDICINE CLINIC | Age: 83
End: 2018-01-01

## 2018-01-01 ENCOUNTER — OFFICE VISIT (OUTPATIENT)
Dept: INTERNAL MEDICINE CLINIC | Age: 83
End: 2018-01-01

## 2018-01-01 VITALS
DIASTOLIC BLOOD PRESSURE: 60 MMHG | RESPIRATION RATE: 14 BRPM | HEART RATE: 59 BPM | WEIGHT: 164.6 LBS | BODY MASS INDEX: 33.18 KG/M2 | OXYGEN SATURATION: 97 % | HEIGHT: 59 IN | SYSTOLIC BLOOD PRESSURE: 110 MMHG | TEMPERATURE: 97.7 F

## 2018-01-01 DIAGNOSIS — E55.9 VITAMIN D DEFICIENCY: ICD-10-CM

## 2018-01-01 DIAGNOSIS — E11.22 CONTROLLED TYPE 2 DIABETES MELLITUS WITH STAGE 4 CHRONIC KIDNEY DISEASE, WITHOUT LONG-TERM CURRENT USE OF INSULIN (HCC): Primary | ICD-10-CM

## 2018-01-01 DIAGNOSIS — N18.30 CHRONIC RENAL IMPAIRMENT, STAGE 3 (MODERATE) (HCC): ICD-10-CM

## 2018-01-01 DIAGNOSIS — I10 ESSENTIAL HYPERTENSION: ICD-10-CM

## 2018-01-01 DIAGNOSIS — E03.9 HYPOTHYROIDISM, UNSPECIFIED TYPE: ICD-10-CM

## 2018-01-01 DIAGNOSIS — N18.4 CONTROLLED TYPE 2 DIABETES MELLITUS WITH STAGE 4 CHRONIC KIDNEY DISEASE, WITHOUT LONG-TERM CURRENT USE OF INSULIN (HCC): Primary | ICD-10-CM

## 2018-01-01 DIAGNOSIS — E78.00 HYPERCHOLESTEROLEMIA: ICD-10-CM

## 2018-01-01 DIAGNOSIS — M85.80 OSTEOPENIA, UNSPECIFIED LOCATION: ICD-10-CM

## 2018-01-01 LAB
25(OH)D3+25(OH)D2 SERPL-MCNC: 36.2 NG/ML (ref 30–100)
ALBUMIN SERPL-MCNC: 3.9 G/DL (ref 3.5–4.7)
ALBUMIN/CREAT UR: 11.8 MG/G CREAT (ref 0–30)
ALBUMIN/GLOB SERPL: 1.3 {RATIO} (ref 1.2–2.2)
ALP SERPL-CCNC: 49 IU/L (ref 39–117)
ALT SERPL-CCNC: 9 IU/L (ref 0–32)
APPEARANCE UR: CLEAR
AST SERPL-CCNC: 16 IU/L (ref 0–40)
BACTERIA #/AREA URNS HPF: ABNORMAL /[HPF]
BACTERIA UR CULT: NORMAL
BASOPHILS # BLD AUTO: 0.1 X10E3/UL (ref 0–0.2)
BASOPHILS NFR BLD AUTO: 1 %
BILIRUB SERPL-MCNC: 0.2 MG/DL (ref 0–1.2)
BILIRUB UR QL STRIP: NEGATIVE
BUN SERPL-MCNC: 30 MG/DL (ref 8–27)
BUN/CREAT SERPL: 15 (ref 12–28)
CALCIUM SERPL-MCNC: 9.2 MG/DL (ref 8.7–10.3)
CASTS URNS QL MICRO: ABNORMAL /LPF
CHLORIDE SERPL-SCNC: 103 MMOL/L (ref 96–106)
CHOLEST SERPL-MCNC: 165 MG/DL (ref 100–199)
CO2 SERPL-SCNC: 21 MMOL/L (ref 18–29)
COLOR UR: YELLOW
CREAT SERPL-MCNC: 1.97 MG/DL (ref 0.57–1)
CREAT UR-MCNC: 63.5 MG/DL
EOSINOPHIL # BLD AUTO: 0.3 X10E3/UL (ref 0–0.4)
EOSINOPHIL NFR BLD AUTO: 3 %
EPI CELLS #/AREA URNS HPF: ABNORMAL /HPF
ERYTHROCYTE [DISTWIDTH] IN BLOOD BY AUTOMATED COUNT: 19.1 % (ref 12.3–15.4)
EST. AVERAGE GLUCOSE BLD GHB EST-MCNC: 126 MG/DL
GFR SERPLBLD CREATININE-BSD FMLA CKD-EPI: 23 ML/MIN/1.73
GFR SERPLBLD CREATININE-BSD FMLA CKD-EPI: 26 ML/MIN/1.73
GLOBULIN SER CALC-MCNC: 2.9 G/DL (ref 1.5–4.5)
GLUCOSE SERPL-MCNC: 95 MG/DL (ref 65–99)
GLUCOSE UR QL: NEGATIVE
HBA1C MFR BLD: 6 % (ref 4.8–5.6)
HCT VFR BLD AUTO: 31.3 % (ref 34–46.6)
HDLC SERPL-MCNC: 51 MG/DL
HGB BLD-MCNC: 9.5 G/DL (ref 11.1–15.9)
HGB UR QL STRIP: NEGATIVE
IMM GRANULOCYTES # BLD: 0 X10E3/UL (ref 0–0.1)
IMM GRANULOCYTES NFR BLD: 1 %
INTERPRETATION, 910389: NORMAL
INTERPRETATION: NORMAL
KETONES UR QL STRIP: NEGATIVE
LDLC SERPL CALC-MCNC: 74 MG/DL (ref 0–99)
LEUKOCYTE ESTERASE UR QL STRIP: ABNORMAL
LYMPHOCYTES # BLD AUTO: 2.1 X10E3/UL (ref 0.7–3.1)
LYMPHOCYTES NFR BLD AUTO: 28 %
Lab: NORMAL
MCH RBC QN AUTO: 23.9 PG (ref 26.6–33)
MCHC RBC AUTO-ENTMCNC: 30.4 G/DL (ref 31.5–35.7)
MCV RBC AUTO: 79 FL (ref 79–97)
MICRO URNS: ABNORMAL
MICROALBUMIN UR-MCNC: 7.5 UG/ML
MONOCYTES # BLD AUTO: 0.7 X10E3/UL (ref 0.1–0.9)
MONOCYTES NFR BLD AUTO: 9 %
MUCOUS THREADS URNS QL MICRO: PRESENT
NEUTROPHILS # BLD AUTO: 4.4 X10E3/UL (ref 1.4–7)
NEUTROPHILS NFR BLD AUTO: 58 %
NITRITE UR QL STRIP: NEGATIVE
PDF IMAGE, 910387: NORMAL
PH UR STRIP: 6 [PH] (ref 5–7.5)
PLATELET # BLD AUTO: 190 X10E3/UL (ref 150–379)
POTASSIUM SERPL-SCNC: 5 MMOL/L (ref 3.5–5.2)
PROT SERPL-MCNC: 6.8 G/DL (ref 6–8.5)
PROT UR QL STRIP: NEGATIVE
RBC # BLD AUTO: 3.97 X10E6/UL (ref 3.77–5.28)
RBC #/AREA URNS HPF: ABNORMAL /HPF
SODIUM SERPL-SCNC: 140 MMOL/L (ref 134–144)
SP GR UR: 1.01 (ref 1–1.03)
TRIGL SERPL-MCNC: 199 MG/DL (ref 0–149)
TSH SERPL DL<=0.005 MIU/L-ACNC: 2.61 UIU/ML (ref 0.45–4.5)
URINALYSIS REFLEX, 377202: ABNORMAL
UROBILINOGEN UR STRIP-MCNC: 0.2 MG/DL (ref 0.2–1)
VLDLC SERPL CALC-MCNC: 40 MG/DL (ref 5–40)
WBC # BLD AUTO: 7.6 X10E3/UL (ref 3.4–10.8)
WBC #/AREA URNS HPF: ABNORMAL /HPF

## 2018-01-01 PROCEDURE — 81001 URINALYSIS AUTO W/SCOPE: CPT

## 2018-01-01 PROCEDURE — 80061 LIPID PANEL: CPT

## 2018-01-01 PROCEDURE — 82043 UR ALBUMIN QUANTITATIVE: CPT

## 2018-01-01 PROCEDURE — 83036 HEMOGLOBIN GLYCOSYLATED A1C: CPT

## 2018-01-01 PROCEDURE — 80053 COMPREHEN METABOLIC PANEL: CPT

## 2018-01-01 PROCEDURE — 82306 VITAMIN D 25 HYDROXY: CPT

## 2018-01-01 PROCEDURE — 85025 COMPLETE CBC W/AUTO DIFF WBC: CPT

## 2018-01-01 PROCEDURE — 87086 URINE CULTURE/COLONY COUNT: CPT

## 2018-01-01 PROCEDURE — 84443 ASSAY THYROID STIM HORMONE: CPT

## 2018-01-01 PROCEDURE — 36415 COLL VENOUS BLD VENIPUNCTURE: CPT

## 2018-01-01 RX ORDER — LEVOTHYROXINE SODIUM 112 UG/1
TABLET ORAL
Qty: 90 TAB | Refills: 2 | Status: SHIPPED | OUTPATIENT
Start: 2018-01-01

## 2018-02-22 NOTE — PROGRESS NOTES
Patient came into office to have labs drawn but no order in system. Order labs based off last office visit notes and last labs collected. Patient has office visit on 2/27/18.  Please advise if any labs need to be added

## 2018-02-27 NOTE — PROGRESS NOTES
Chief Complaint   Patient presents with    Hypertension     6 month follow up    Diabetes     1. Have you been to the ER, urgent care clinic since your last visit? Hospitalized since your last visit? No    2. Have you seen or consulted any other health care providers outside of the [address] [place] [address] since your last visit? Include any pap smears or colon screening.  No    Health Maintenance Due   Topic Date Due    FOOT EXAM Q1  12/22/2016

## 2018-02-27 NOTE — PROGRESS NOTES
Hypertension (6 month follow up) and Diabetes       HPI:  Antonieta Hernández is a 80y.o. year old female who is here for a follow up visit. She was last seen by me on 2/22/2018. She reports the following:    DM- kidney problems Cr went up from 1.9    Changed to new insurance.  Kyle Caceres not covered. a1c is 6.0 today. No lows. Hypertension    Patient has a history of HTN. The patient is taking hypertensive medications compliantly without side effects. Denies chest pain, dyspnea, edema, or symptoms of TIA's. BP Readings from Last 3 Encounters:   02/27/18 110/60   08/24/17 136/80   03/31/17 126/80         Hypothyroidism    Patient is being follow for underactive thyroid and takes medication regularly. She is aware to take the medication on an empty stomach first thing in the morning and not to eat 30 min to 1 hr after taking their pill. No symptoms of hypo or hyperthyroidism: no decreased or increased weight, no feeling cold/chilly or excessively warm, no diarrhea or constipation, no undue sweatiness, anxiety or palpitations. Lab Results   Component Value Date/Time    TSH 2.610 02/22/2018 10:50 AM    TSH 3.170 01/04/2016 09:11 AM         Hyperlipidemia    Patient has history of hyperlipidemia that is being managed with medications. She has been taking her statin cholesterol medication regularly without side effects such as myalgias or upper abdominal pain, nausea or jaundice. Lab Results   Component Value Date/Time    Cholesterol, total 165 02/22/2018 10:50 AM    HDL Cholesterol 51 02/22/2018 10:50 AM    LDL, calculated 74 02/22/2018 10:50 AM    VLDL, calculated 40 02/22/2018 10:50 AM    Triglyceride 199 (H) 02/22/2018 10:50 AM           Assessment and Plan        1. Controlled type 2 diabetes mellitus with stage 4 chronic kidney disease, without long-term current use of insulin ([address]  Ok to stop Kyle Caceres because of cost.  Alternative is tradjenta for kidney issues but likely expensive for her also. Trial off medication as her a1c is 6.0 and she eats a low carb diet. 2. Hypercholesterolemia  The nature of cardiac risk has been fully discussed with this patient. I have made her aware of her LDL target goal given her cardiovascular risk analysis. I have discussed the appropriate diet. The need for lifelong compliance in order to reduce risk is stressed. A regular exercise program is recommended to help achieve and maintain normal body weight, fitness and improve lipid balance. The risks and benefits of medications were discussed. Last cholesterol labs reviewed with patient. Patient made aware to get liver checked every 6 months. Continue with  pravastatin    3. Hypothyroidism, unspecified type  Tolerating thyroid medication and takes on empty stomach. Aware to watch for symptoms of overactive thyroid like heat intolerance, loose stools, increase appetite, and weight loss along with palpitations. Pt informed if low thyroid symptoms, like cold intolerance, weight gain, hair loss, edema, and mental slowness, please call to get TSH rechecked. Noncompliance of medications can lead to coma and severe mental status changes. Stay on current regimen of  Synthroid. 4. Essential hypertension  Tolerating medication. Denies dizziness that is positional, SOB, or chest pain. Understands the importance of compliance to reduce risk of future heart failure. Agreed to call if any of above symptoms develop and  stay on current regimen of    Key CAD CHF Meds             lisinopril (PRINIVIL, ZESTRIL) 10 mg tablet  (Taking) take 1 tablet by mouth once daily    amLODIPine (NORVASC) 10 mg tablet  (Taking) take 1 tablet by mouth once daily    aspirin delayed-release (ASPIRIN EC) 81 mg tablet  (Taking) Take 1 Tab by mouth daily. no cough with medication. 5. Chronic renal impairment, stage 3 (moderate)  Slight increase in Cr. Sees nephrology. Copy given to patient of labs. Doesn't take any NSAIDS. Increase fluid intake. Potassium is normal.        Visit Vitals    /60 (BP 1 Location: Left arm, BP Patient Position: Sitting)    Pulse (!) 59    Temp 97.7 °F (36.5 °C) (Oral)    Resp 14    Ht 4' 11\" (1.499 m)    Wt 164 lb 9.6 oz (74.7 kg)    SpO2 97%    BMI 33.25 kg/m2       Historical Data    Past Medical History:   Diagnosis Date    Chronic kidney disease (CKD), stage III (moderate)     Dr. Leila Gould    Diabetes 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 Nasir Fiore Left inguinal hernia     Melanoma ([facility] 2006    Osteopenia 11/25/13    Pneumonia 2005    Valvular heart disease 12/15/2016    grade 2 diastolic dysfunction, moderate to marked annular calcification of mitral valge, moderate calcification of aortic valve with mild-mod stenosis, mild TR, trivial pericardial effusion.  Vitamin D deficiency        Past Surgical History:   Procedure Laterality Date    HX CATARACT REMOVAL Right 2008    HX HYSTERECTOMY  1984    TARIQ/and unilat Ooph    CA MELANOMA FOLLOW UP COMPLETED  2006    chest       Outpatient Encounter Prescriptions as of 2/27/2018   Medication Sig Dispense Refill    lisinopril (PRINIVIL, ZESTRIL) 10 mg tablet take 1 tablet by mouth once daily 90 Tab 6    amLODIPine (NORVASC) 10 mg tablet take 1 tablet by mouth once daily 90 Tab 6    SYNTHROID 112 mcg tablet take 1 tablet by mouth once daily BEFORE BREAKFAST 90 Tab 3    pravastatin (PRAVACHOL) 40 mg tablet Take 1 Tab by mouth nightly. 90 Tab 3    Cholecalciferol, Vitamin D3, (VITAMIN D3) 2,000 unit cap Take 1 capsule by mouth daily.  aspirin delayed-release (ASPIRIN EC) 81 mg tablet Take 1 Tab by mouth daily. 30 Tab 11    calcium-vitamin D (CALCIUM 600 + D,3,) 600 mg(1,500mg) -200 unit Tab Take 1 Tab by mouth two (2) times daily (with meals).       [DISCONTINUED] SITagliptin (JANUVIA) 50 mg tablet take 1 tablet by mouth once daily 90 Tab 6     No facility-administered encounter medications on file as of 2/27/2018. Allergies   Allergen Reactions    Lipitor [Atorvastatin] Myalgia        Social History     Social History    Marital status:      Spouse name: N/A    Number of children: N/A    Years of education: N/A     Occupational History    Not on file. Social History Main Topics    Smoking status: Never Smoker    Smokeless tobacco: Never Used    Alcohol use No    Drug use: No    Sexual activity: No     Other Topics Concern    Not on file     Social History Narrative        family history includes Diabetes in her mother; Heart Disease in her brother and mother; Hypertension in her father and mother; Lupus in her sister; Stroke in her father; Thyroid Disease in her maternal grandfather, sister, and sister. Review of Systems   Constitutional: Negative for weight loss. Eyes: Negative for blurred vision. Respiratory: Negative for shortness of breath. Cardiovascular: Negative for chest pain. Gastrointestinal: Negative for abdominal pain. Genitourinary: Negative for dysuria and frequency. Skin: Negative for rash. Neurological: Negative for dizziness, focal weakness, weakness and headaches. Endo/Heme/Allergies: Negative for environmental allergies. Does not bruise/bleed easily. Physical Exam   Constitutional: She appears well-developed and well-nourished. She is active. Non-toxic appearance. She does not have a sickly appearance. She does not appear ill. No distress. Eyes: Conjunctivae are normal. Right eye exhibits no discharge. Cardiovascular: Normal rate, regular rhythm, S1 normal, S2 normal, normal heart sounds and normal pulses. Exam reveals no gallop and no friction rub. Pulmonary/Chest: Effort normal and breath sounds normal. No respiratory distress. Abdominal: Soft.  Bowel sounds are normal.   Musculoskeletal: She exhibits no edema or deformity. Neurological: She is alert. Skin: Skin is warm and dry. No rash noted. No pallor. Psychiatric: She has a normal mood and affect. Her behavior is normal.   Vitals reviewed. Ortho Exam      No orders of the defined types were placed in this encounter. I have reviewed the patient's medical history in detail and updated the computerized patient record. We had a prolonged discussion about these complex clinical issues and went over the various important aspects to consider. All questions were answered. Advised her to call back or return to office if symptoms do not improve, change in nature, or persist.    She was given an after visit summary or informed of [employer] Access which includes patient instructions, diagnoses, current medications, & vitals. She expressed understanding with the diagnosis and plan.

## 2018-02-27 NOTE — MR AVS SNAPSHOT
[address] [place] [address], [address] [place] [address] 
[phone] Patient: Gordo Swan MRN: [id number] DDI:[birth year]10[birth year]14 Visit Information Date & Time Provider Department Dept. Phone Encounter #  
 2/27/2018  2:15 PM Maximus Holly MD [place] [id number] [facility] [phone] Follow-up Instructions Return in about 3 months (around 5/27/2018) for Follow up 30 min slot. Upcoming Health Maintenance Date Due  
 FOOT EXAM Q1 12/22/2016 EYE EXAM RETINAL OR DILATED Q1 5/18/2018 HEMOGLOBIN A1C Q6M 8/22/2018 MEDICARE YEARLY EXAM 8/25/2018 MICROALBUMIN Q1 2/22/2019 LIPID PANEL Q1 2/22/2019 GLAUCOMA SCREENING Q2Y 5/18/2019 DTaP/Tdap/Td series (2 - Td) 12/4/2024 Allergies as of 2/27/2018  Review Complete On: 2/27/2018 By: Maximus Holly MD  
  
 Severity Noted Reaction Type Reactions Lipitor [Atorvastatin]  03/05/2013   Side Effect Myalgia Current Immunizations  Reviewed on 12/22/2015 Name Date Influenza High Dose Vaccine PF 10/7/2016, 10/1/2015 Influenza Vaccine 10/20/2014 Influenza Vaccine Split 9/27/2012 Pneumococcal Conjugate (PCV-13) 4/4/2016 Pneumococcal Vaccine (Unspecified Type) 6/5/2011 Tdap 12/4/2014 Not reviewed this visit Vitals BP Pulse Temp Resp Height(growth percentile) Weight(growth percentile) 110/60 (BP 1 Location: Left arm, BP Patient Position: Sitting) (!) 59 97.7 °F (36.5 °C) (Oral) 14 4' 11\" (1.499 m) 164 lb 9.6 oz (74.7 kg) SpO2 BMI OB Status Smoking Status 97% 33.25 kg/m2 Postmenopausal Never Smoker Vitals History BMI and BSA Data Body Mass Index Body Surface Area  
 33.25 kg/m 2 1.76 m 2 Your Updated Medication List  
  
   
This list is accurate as of 2/27/18  3:28 PM.  Always use your most recent med list. amLODIPine 10 mg tablet Commonly known as:  Julianna Anderson take 1 tablet by mouth once daily  
  
 aspirin delayed-release 81 mg tablet Commonly known as:  Aspirin EC Take 1 Tab by mouth daily. CALCIUM 600 + D(3) 600 mg(1,500mg) -200 unit Tab Generic drug:  calcium-vitamin D Take 1 Tab by mouth two (2) times daily (with meals). lisinopril 10 mg tablet Commonly known as:  PRINIVIL, ZESTRIL  
take 1 tablet by mouth once daily  
  
 pravastatin 40 mg tablet Commonly known as:  PRAVACHOL Take 1 Tab by mouth nightly. SYNTHROID 112 mcg tablet Generic drug:  levothyroxine  
take 1 tablet by mouth once daily BEFORE BREAKFAST  
  
 VITAMIN D3 2,000 unit Cap capsule Generic drug:  Cholecalciferol (Vitamin D3) Take 1 capsule by mouth daily. Follow-up Instructions Return in about 3 months (around 5/27/2018) for Follow up 30 min slot. Introducing [facility]! [employer] introduces [employer] patient portal. Now you can access parts of your medical record, email your doctor's office, and request medication refills online. 1. In your internet browser, go to [email or web address]. [employer] 2. Click on the First Time User? Click Here link in the Sign In box. You will see the New Member Sign Up page. 3. Enter your [employer] Access Code exactly as it appears below. You will not need to use this code after youve completed the sign-up process. If you do not sign up before the expiration date, you must request a new code. · [employer] Access Code: [email or web address] Expires: 5/28/2018  3:28 PM 
 
4. Enter the last four digits of your Social Security Number (xxxx) and Date of Birth (mm/dd/yyyy) as indicated and click Submit. You will be taken to the next sign-up page. 5. Create a [employer] ID. This will be your [employer] login ID and cannot be changed, so think of one that is secure and easy to remember. 6. Create a [employer] password. You can change your password at any time. 7. Enter your Password Reset Question and Answer. This can be used at a later time if you forget your password. 8. Enter your e-mail address. You will receive e-mail notification when new information is available in [address] 9. Click Sign Up. You can now view and download portions of your medical record. 10. Click the Download Summary menu link to download a portable copy of your medical information. If you have questions, please visit the Frequently Asked Questions section of the [employer] website. Remember, [employer] is NOT to be used for urgent needs. For medical emergencies, dial 911. Now available from your iPhone and Android! Please provide this summary of care documentation to your next provider. Your primary care clinician is listed as Duglas Latham. If you have any questions after today's visit, please call [phone].

## 2018-02-27 NOTE — LETTER
2/27/2018 3:26 PM 
 
Ms. Teresa Heard [address] [facility] [id number] Dear Teresa Heard: 
 
Please find your most recent results below. Resulted Orders MICROALBUMIN, UR, RAND W/ MICROALB/CREAT RATIO Result Value Ref Range Creatinine, urine 63.5 Not Estab. mg/dL Microalbumin, urine 7.5 Not Estab. ug/mL Microalb/Creat ratio (ug/mg creat.) 11.8 0.0 - 30.0 mg/g creat Narrative Performed at:  [address]  [id number] : Osvaldo Cm MD, Phone:  [phone] UA/M W/RFLX CULTURE, ROUTINE Result Value Ref Range Specific Gravity 1.011 1.005 - 1.030  
 pH (UA) 6.0 5.0 - 7.5 Color Yellow Yellow Appearance Clear Clear Leukocyte Esterase 2+ (A) Negative Protein Negative Negative/Trace Glucose Negative Negative Ketone Negative Negative Blood Negative Negative Bilirubin Negative Negative Urobilinogen 0.2 0.2 - 1.0 mg/dL Nitrites Negative Negative Microscopic Examination See additional order Comment:  
   Microscopic was indicated and was performed. URINALYSIS REFLEX Comment Comment: This specimen has reflexed to a Urine Culture. Narrative Performed at:  [address]  [id number] : Osvaldo Cm MD, Phone:  [phone] HEMOGLOBIN A1C WITH EAG Result Value Ref Range Hemoglobin A1c 6.0 (H) 4.8 - 5.6 % Comment:  
            Pre-diabetes: 5.7 - 6.4 Diabetes: >6.4 Glycemic control for adults with diabetes: <7.0 Estimated average glucose 126 mg/dL Narrative Performed at:  [address]  [id number] : Osvaldo Cm MD, Phone:  [phone] METABOLIC PANEL, COMPREHENSIVE Result Value Ref Range Glucose 95 65 - 99 mg/dL BUN 30 (H) 8 - 27 mg/dL Creatinine 1.97 (H) 0.57 - 1.00 mg/dL GFR est non-AA 23 (L) >59 mL/min/1.73 GFR est AA 26 (L) >59 mL/min/1.73  
 BUN/Creatinine ratio 15 12 - 28 Sodium 140 134 - 144 mmol/L Potassium 5.0 3.5 - 5.2 mmol/L Chloride 103 96 - 106 mmol/L  
 CO2 21 18 - 29 mmol/L Calcium 9.2 8.7 - 10.3 mg/dL Protein, total 6.8 6.0 - 8.5 g/dL Albumin 3.9 3.5 - 4.7 g/dL GLOBULIN, TOTAL 2.9 1.5 - 4.5 g/dL A-G Ratio 1.3 1.2 - 2.2 Bilirubin, total 0.2 0.0 - 1.2 mg/dL Alk. phosphatase 49 39 - 117 IU/L  
 AST (SGOT) 16 0 - 40 IU/L  
 ALT (SGPT) 9 0 - 32 IU/L Narrative Performed at:  [address]  [id number] : Deanna Carroll MD, Phone:  [phone] LIPID PANEL Result Value Ref Range Cholesterol, total 165 100 - 199 mg/dL Triglyceride 199 (H) 0 - 149 mg/dL HDL Cholesterol 51 >39 mg/dL VLDL, calculated 40 5 - 40 mg/dL LDL, calculated 74 0 - 99 mg/dL Narrative Performed at:  [address]  [id number] : Deanna Carroll MD, Phone:  [phone] CBC WITH AUTOMATED DIFF Result Value Ref Range WBC 7.6 3.4 - 10.8 x10E3/uL  
 RBC 3.97 3.77 - 5.28 x10E6/uL HGB 9.5 (L) 11.1 - 15.9 g/dL HCT 31.3 (L) 34.0 - 46.6 % MCV 79 79 - 97 fL  
 MCH 23.9 (L) 26.6 - 33.0 pg  
 MCHC 30.4 (L) 31.5 - 35.7 g/dL  
 RDW 19.1 (H) 12.3 - 15.4 % PLATELET 964 453 - 127 x10E3/uL NEUTROPHILS 58 Not Estab. % Lymphocytes 28 Not Estab. % MONOCYTES 9 Not Estab. % EOSINOPHILS 3 Not Estab. % BASOPHILS 1 Not Estab. %  
 ABS. NEUTROPHILS 4.4 1.4 - 7.0 x10E3/uL Abs Lymphocytes 2.1 0.7 - 3.1 x10E3/uL  
 ABS. MONOCYTES 0.7 0.1 - 0.9 x10E3/uL  
 ABS. EOSINOPHILS 0.3 0.0 - 0.4 x10E3/uL  
 ABS. BASOPHILS 0.1 0.0 - 0.2 x10E3/uL IMMATURE GRANULOCYTES 1 Not Estab. %  
 ABS. IMM. GRANS. 0.0 0.0 - 0.1 x10E3/uL Narrative Performed at:  [address]  [id number] : Mary Holden MD, Phone:  [phone] TSH REFLEX TO T4 Result Value Ref Range TSH 2.610 0.450 - 4.500 uIU/mL Narrative Performed at:  [address]  [id number] : Mary Holden MD, Phone:  [phone] VITAMIN D, 25 HYDROXY Result Value Ref Range VITAMIN D, 25-HYDROXY 36.2 30.0 - 100.0 ng/mL Comment:  
   Vitamin D deficiency has been defined by the [address] [place] [address] practice guideline as a 
level of serum 25-OH vitamin D less than 20 ng/mL (1,2). The Endocrine Society went on to further define vitamin D 
insufficiency as a level between 21 and 29 ng/mL (2). 1. IOM ([facility]). 2010. Dietary reference 
   intakes for calcium and D. [address] [place] [address]: The 
   [employer]. 2. Nikhil MF, Izzy NC, Kay HART, et al. 
   Evaluation, treatment, and prevention of vitamin D 
   deficiency: an Endocrine Society clinical practice 
   guideline. JCEM. 2011 Jul; 96(7):1911-30. Narrative Performed at:  [address]  [id number] : Mary Holden MD, Phone:  [phone] MICROSCOPIC EXAMINATION Result Value Ref Range WBC 6-10 (A) 0 - 5 /hpf  
 RBC 0-2 0 - 2 /hpf Epithelial cells 0-10 0 - 10 /hpf Casts None seen None seen /lpf Mucus Present Not Estab. Bacteria Few None seen/Few Narrative Performed at:  [address]  [id number] : Mary Holden MD, Phone:  [phone] URINE CULTURE, ROUTINE Result Value Ref Range Urine Culture, Routine Mixed urogenital aureliano 10,000-25,000 colony forming units per mL Narrative Performed at:  [address]  [id number] : Mary Holden MD, Phone:  [phone] CKD REPORT Result Value Ref Range Interpretation Note Comment:  
   ------------------------------- 
CHRONIC KIDNEY DISEASE: 
EGFR, BLOOD PRESSURE, AND PROTEINURIA ASSESSMENT The overall regression of eGFR with time is not 
statistically significant. Current eGFR is 23 mL/min/1.73mE2 
corresponding to CKD stage 4. Multiply eGFR by 1.159 if 
patient is . Potassium is within goal and 
has not changed significantly, was 4.7 and now is 5.0 
mmol/L. Albumin:Creatinine ratio is not elevated and has 
decreased, was 14.6 and now is 11.8 mg/g creat. Glycemic 
control (HB A1c: 6.0 %) is within goal. 
EGFR, BLOOD PRESSURE, AND PROTEINURIA TREATMENT SUGGESTIONS 
- Based upon current eGFR, patient is at very high risk for 
adverse outcomes such as CKD progression, CVD, and 
mortality. Given patient's advanced age, we are unable to 
provide specific blood pressure treatment suggestions. Individualize blood pressure goals based on the patient's 
comorbidities and to avoid orthostatic hypotension and other 
medication side effects. EGFR, BLOOD PRES 
SURE, AND PROTEINURIA FOLLOW-UP 
- Spot Urine Panel (Albumin preferred) within 12 months; 
Hemoglobin A1C within 6 months; fasting Renal Panel within 3 
months; 
- 
BONE and MINERAL ASSESSMENT Calcium is within goal and has not changed significantly, 
was 9.4 and now is 9.2 mg/dL. Carbon Dioxide is below goal 
and has not changed significantly, was 21 and now is 21 
mmol/L. Vitamin D is adequate (was 48.0 and now is 36.2 
ng/mL). BONE and MINERAL TREATMENT SUGGESTIONS 
- Interpretations require simultaneous measurements of serum 
calcium and phosphorus. If not on alkali, begin sodium 
bicarbonate, one 650 mg pill 2-3 times daily, otherwise 
increase dose. BONE and MINERAL FOLLOW-UP 
- 
25-Hydroxy Vitamin D within 12 months; fasting Renal Panel 
within 3 months; fasting PTH with Renal Panel is due; 
- 
LIPIDS ASSESSMENT 
LDL-C is normal and has not changed significantly, was 79 and now is 74 mg/dL. Triglyceride is borderline high and has 
decreased, was 205 and now is 199 mg/dL. N 
on-HDL Cholesterol 
is normal and has not changed significantly, was 120 and now 
is 114 mg/dL. HDL-C is normal and has not changed 
significantly, was 56 and now is 51 mg/dL. LIPIDS TREATMENT SUGGESTIONS 
- Therapeutic lifestyle changes are always valuable to 
maintain optimal blood lipid status (diet, exercise, weight 
management). If at least a 50% LDL reduction from baseline 
has not been achieved, begin or increase statin. Consider 
measurement of LDL particle number or Apo B to adjudicate 
need for further LDL lowering therapy. If statin cannot be 
tolerated or increased, alternatives include use of an 
intestinal agent (ezetimibe or bile acid sequestrant), 
niacin, and/or fish oil. LIPIDS FOLLOW-UP 
- 
fasting Lipid Panel within 12 months; 
- 
ANEMIA ASSESSMENT Hemoglobin is low and has decreased, was 10.5 and now is 9.5 
g/dL. Hemoglobin target assumes RICHARD is not in use. ANEMIA TREATMENT SUGGESTIONS 
- Iron deficiency is a common cause of anemia in CKD. Recommend measure 
ment of Ferritin and TSAT. ANEMIA FOLLOW-UP 
- 
Fe/TIBC (TSAT) and Ferritin with CBC is due; CBC within 3 
months; 
------------------------------- DISCLAIMER These assessments and treatment suggestions are provided as 
a convenience in support of the physician-patient 
relationship and are not intended to replace the physician's 
clinical judgment. They are derived from national guidelines 
in addition to other evidence and expert opinion. The 
clinician should consider this information within the 
context of clinical opinion and the individual patient. SEE GUIDANCE FOR CHRONIC KIDNEY DISEASE PROGRAM: Kidney Disease Improving Global Outcomes (KDIGO) clinical practice 
guidelines are at http://kdigo. org/home/guidelines/. 
[facility] Kidney Disease Outcomes Quality Initiative (KDOQI (TM)), with its limitations and 
disclaimers, are at www.kidney. org/professionals/KDOQI. This 
program is intended for patients who have been diagnosed 
with stages 3, 4, or pre-dialysi 
s 5 CKD. It is not intended 
for children, pregnant patients, or transplant patients. Narrative Performed at:  [address] [place] [address]  [id number] : Duglas Thurman PhD, Phone:  [phone] CVD REPORT Result Value Ref Range INTERPRETATION Note Comment:  
   Supplemental report is available. PDF IMAGE Not applicable Narrative Performed at:  [address] [place] [address]  [id number] : Duglas Thurman PhD, Phone:  [phone] DIABETES PATIENT EDUCATION Result Value Ref Range PDF Image Not applicable Narrative Performed at:  [address] [place] [address]  [id number] : Duglas Thurman PhD, Phone:  [phone] RECOMMENDATIONS: 
 
 
Please call me if you have any questions: [phone] Sincerely, Darren Braun MD

## 2018-03-12 NOTE — PROCEDURE: REPAIR NOTE
denies loose teeth Referred To Mid-Level For Closure Text (Leave Blank If You Do Not Want): After obtaining clear surgical margins the patient was sent to a mid-level provider for surgical repair.  The patient understands they will receive post-surgical care and follow-up from the mid-level provider.

## 2018-04-05 ENCOUNTER — APPOINTMENT (OUTPATIENT)
Age: 83
Setting detail: DERMATOLOGY
End: 2018-04-05

## 2018-04-05 DIAGNOSIS — L82.0 INFLAMED SEBORRHEIC KERATOSIS: ICD-10-CM

## 2018-04-05 DIAGNOSIS — L57.0 ACTINIC KERATOSIS: ICD-10-CM

## 2018-04-05 DIAGNOSIS — L82.1 OTHER SEBORRHEIC KERATOSIS: ICD-10-CM

## 2018-04-05 DIAGNOSIS — Z85.820 PERSONAL HISTORY OF MALIGNANT MELANOMA OF SKIN: ICD-10-CM

## 2018-04-05 DIAGNOSIS — L85.3 XEROSIS CUTIS: ICD-10-CM

## 2018-04-05 PROBLEM — D48.5 NEOPLASM OF UNCERTAIN BEHAVIOR OF SKIN: Status: ACTIVE | Noted: 2018-04-05

## 2018-04-05 PROCEDURE — OTHER BIOPSY BY SHAVE METHOD: OTHER

## 2018-04-05 PROCEDURE — 11101: CPT

## 2018-04-05 PROCEDURE — OTHER COUNSELING: OTHER

## 2018-04-05 PROCEDURE — 11100: CPT

## 2018-04-05 PROCEDURE — OTHER REASSURANCE: OTHER

## 2018-04-05 PROCEDURE — 99213 OFFICE O/P EST LOW 20 MIN: CPT | Mod: 25

## 2018-04-05 ASSESSMENT — LOCATION SIMPLE DESCRIPTION DERM
LOCATION SIMPLE: RIGHT UPPER BACK
LOCATION SIMPLE: LEFT HAND
LOCATION SIMPLE: RIGHT POSTERIOR UPPER ARM
LOCATION SIMPLE: RIGHT CHEEK
LOCATION SIMPLE: LEFT LOWER BACK
LOCATION SIMPLE: LEFT POSTERIOR UPPER ARM
LOCATION SIMPLE: LEFT FOREHEAD
LOCATION SIMPLE: LEFT FOREARM
LOCATION SIMPLE: RIGHT FOREARM
LOCATION SIMPLE: LEFT UPPER BACK

## 2018-04-05 ASSESSMENT — LOCATION DETAILED DESCRIPTION DERM
LOCATION DETAILED: LEFT RADIAL DORSAL HAND
LOCATION DETAILED: LEFT FOREHEAD
LOCATION DETAILED: RIGHT DISTAL POSTERIOR UPPER ARM
LOCATION DETAILED: RIGHT INFERIOR LATERAL MALAR CHEEK
LOCATION DETAILED: LEFT SUPERIOR UPPER BACK
LOCATION DETAILED: LEFT DISTAL DORSAL FOREARM
LOCATION DETAILED: RIGHT MID-UPPER BACK
LOCATION DETAILED: LEFT DISTAL POSTERIOR UPPER ARM
LOCATION DETAILED: RIGHT DISTAL DORSAL FOREARM
LOCATION DETAILED: LEFT SUPERIOR MEDIAL MIDBACK

## 2018-04-05 ASSESSMENT — LOCATION ZONE DERM
LOCATION ZONE: HAND
LOCATION ZONE: FACE
LOCATION ZONE: TRUNK
LOCATION ZONE: ARM

## 2018-04-05 NOTE — PROCEDURE: BIOPSY BY SHAVE METHOD
Render Post-Care Instructions In Note?: no
X Size Of Lesion In Cm: 0
Consent: Written consent was obtained and risks were reviewed including but not limited to scarring, infection, bleeding, scabbing, incomplete removal, nerve damage and allergy to anesthesia.
Electrodesiccation And Curettage Text: The wound bed was treated with electrodesiccation and curettage after the biopsy was performed.
Detail Level: Detailed
Biopsy Type: H and E
Cryotherapy Text: The wound bed was treated with cryotherapy after the biopsy was performed.
Anesthesia Type: 1% lidocaine with epinephrine and a 1:10 solution of 8.4% sodium bicarbonate
Hemostasis: Aluminum Chloride
Curettage Text: The wound bed was treated with curettage after the biopsy was performed.
Post-Care Instructions: I reviewed with the patient in detail post-care instructions. Patient is to keep the biopsy site dry overnight, and then apply bacitracin twice daily until healed.
Anesthesia Volume In Cc (Will Not Render If 0): 0.5
Billing Type: Third-Party Bill
Dressing: bandage
Notification Instructions: Patient will be notified of biopsy results. However, patient instructed to call the office if not contacted within 2 weeks.
Silver Nitrate Text: The wound bed was treated with silver nitrate after the biopsy was performed.
Biopsy Method: Dermablade
Type Of Destruction Used: Curettage
Wound Care: Bacitracin
Electrodesiccation Text: The wound bed was treated with electrodesiccation after the biopsy was performed.

## 2018-04-05 NOTE — HPI: SKIN LESIONS
How Severe Is Your Skin Lesion?: mild
Is This A New Presentation, Or A Follow-Up?: Skin Lesions
Additional History: Always checked during routine visits.

## 2018-05-15 ENCOUNTER — APPOINTMENT (OUTPATIENT)
Age: 83
Setting detail: DERMATOLOGY
End: 2018-05-15

## 2018-05-15 DIAGNOSIS — Z48.02 ENCOUNTER FOR REMOVAL OF SUTURES: ICD-10-CM

## 2018-05-15 PROCEDURE — OTHER LIQUID NITROGEN: OTHER

## 2018-05-15 PROCEDURE — OTHER CRYOSURGICAL DESTRUCTION: OTHER

## 2018-05-15 PROCEDURE — OTHER SUTURE REMOVAL (GLOBAL PERIOD): OTHER

## 2018-05-15 PROCEDURE — 17283 DSTR MAL LS F/E/E/N/L/M2.1-3: CPT | Mod: 59

## 2018-05-15 PROCEDURE — 99024 POSTOP FOLLOW-UP VISIT: CPT

## 2018-05-15 PROCEDURE — 17000 DESTRUCT PREMALG LESION: CPT

## 2018-05-15 ASSESSMENT — LOCATION SIMPLE DESCRIPTION DERM
LOCATION SIMPLE: LEFT HAND
LOCATION SIMPLE: LEFT FOREHEAD
LOCATION SIMPLE: RIGHT CHEEK

## 2018-05-15 ASSESSMENT — LOCATION ZONE DERM
LOCATION ZONE: HAND
LOCATION ZONE: FACE

## 2018-05-15 ASSESSMENT — LOCATION DETAILED DESCRIPTION DERM
LOCATION DETAILED: LEFT FOREHEAD
LOCATION DETAILED: RIGHT INFERIOR LATERAL MALAR CHEEK
LOCATION DETAILED: LEFT RADIAL DORSAL HAND

## 2018-05-15 NOTE — PROCEDURE: SUTURE REMOVAL (GLOBAL PERIOD)
Detail Level: Detailed
Add 89280 Cpt? (Important Note: In 2017 The Use Of 37915 Is Being Tracked By Cms To Determine Future Global Period Reimbursement For Global Periods): yes

## 2018-05-15 NOTE — PROCEDURE: CRYOSURGICAL DESTRUCTION
Detail Level: Detailed
Consent was obtained from the patient. The risks and benefits to therapy were discussed in detail. Specifically, the risks of infection, scarring, bleeding, prolonged wound healing, incomplete removal, allergy to anesthesia, nerve injury and recurrence were addressed. Alternatives to liquid nitrogen, such as ED&C, surgical removal, XRT were also discussed.  Prior to the procedure, the treatment site was clearly identified and confirmed by the patient. All components of Universal Protocol/PAUSE Rule completed.
Bill As A Line Item Or As Units: Line Item
Total Volume (Ccs): 1
Number Of Freeze-Thaw Cycles: 3 freeze-thaw cycles
Size Of Lesion In Cm: 2.1
Anesthesia Volume In Cc: 0
Add Intralesional Injection: No
Anesthesia Type: 1% lidocaine with epinephrine
Post-Care Instructions: I reviewed with the patient in detail post-care instructions. Patient is to keep the area dry for 48 hours, and not to engage in any heavy lifting, exercise, or swimming for the next 14 days. Should the patient develop any fevers, chills, bleeding, severe pain patient will contact the office immediately.
Additional Information: (Optional): The wound was cleaned, and a dressing was applied.  The patient received detailed post-op instructions.
Pre-Procedure: The surgical site was antiseptically prepared.
Medication Injected: 5-Fluorouracil
Total Time In Minutes: 3 minutes

## 2018-05-30 NOTE — PROCEDURE: MOHS SURGERY
no Crescentic Complex Repair Preamble Text (Leave Blank If You Do Not Want): Extensive wide undermining was performed.

## 2018-09-05 NOTE — TELEPHONE ENCOUNTER
----- Message from Kalli Hernandez sent at 9/5/2018  2:47 PM EDT -----  Regarding: Dr. Sarai Garrett, Nader Rashid, requested a call back to discuss whether a f/u appt would be necessary before or after trip out of town in late September or early October for a few weeks. Best contact is [phone].   Pt last fup was [id number]

## 2018-09-06 NOTE — TELEPHONE ENCOUNTER
Spoke with patient's son. He will call office back to schedule appointment for mid November when patient returns from trip.

## 2018-10-09 ENCOUNTER — APPOINTMENT (OUTPATIENT)
Age: 83
Setting detail: DERMATOLOGY
End: 2018-10-11

## 2018-10-09 DIAGNOSIS — L72.0 EPIDERMAL CYST: ICD-10-CM

## 2018-10-09 DIAGNOSIS — L57.0 ACTINIC KERATOSIS: ICD-10-CM

## 2018-10-09 DIAGNOSIS — Z85.828 PERSONAL HISTORY OF OTHER MALIGNANT NEOPLASM OF SKIN: ICD-10-CM

## 2018-10-09 DIAGNOSIS — L85.3 XEROSIS CUTIS: ICD-10-CM

## 2018-10-09 PROCEDURE — OTHER LIQUID NITROGEN: OTHER

## 2018-10-09 PROCEDURE — OTHER COUNSELING: OTHER

## 2018-10-09 PROCEDURE — 17000 DESTRUCT PREMALG LESION: CPT

## 2018-10-09 PROCEDURE — OTHER PRESCRIPTION: OTHER

## 2018-10-09 PROCEDURE — 99213 OFFICE O/P EST LOW 20 MIN: CPT | Mod: 25

## 2018-10-09 PROCEDURE — 17003 DESTRUCT PREMALG LES 2-14: CPT

## 2018-10-09 PROCEDURE — OTHER EXTRACTIONS: OTHER

## 2018-10-09 PROCEDURE — OTHER REASSURANCE: OTHER

## 2018-10-09 RX ORDER — AMMONIUM LACTATE 120 MG/G
12% CREAM TOPICAL BID
Qty: 1 | Refills: 3 | Status: ERX | COMMUNITY
Start: 2018-10-09

## 2018-10-09 ASSESSMENT — LOCATION ZONE DERM
LOCATION ZONE: FACE
LOCATION ZONE: NOSE
LOCATION ZONE: ARM

## 2018-10-09 ASSESSMENT — LOCATION DETAILED DESCRIPTION DERM
LOCATION DETAILED: NASAL ROOT
LOCATION DETAILED: RIGHT INFERIOR MEDIAL FOREHEAD
LOCATION DETAILED: RIGHT NASAL ALAR GROOVE
LOCATION DETAILED: RIGHT PROXIMAL POSTERIOR UPPER ARM
LOCATION DETAILED: NASAL TIP
LOCATION DETAILED: RIGHT NASAL SIDEWALL

## 2018-10-09 ASSESSMENT — LOCATION SIMPLE DESCRIPTION DERM
LOCATION SIMPLE: RIGHT POSTERIOR UPPER ARM
LOCATION SIMPLE: NOSE
LOCATION SIMPLE: RIGHT FOREHEAD
LOCATION SIMPLE: RIGHT NOSE

## 2018-10-09 NOTE — PROCEDURE: EXTRACTIONS
Render Post-Care Instructions In Note?: no
Prep Text (Optional): Prior to removal the treatment areas were prepped in the usual fashion.
Extraction Method: comedo extractor
Acne Type: Comedonal Lesions
Post-Care Instructions: I reviewed with the patient in detail post-care instructions. Patient is to keep the treatment areas dry overnight, and then apply bacitracin twice daily until healed. Patient may apply hydrogen peroxide soaks to remove any crusting.
Consent was obtained and risks were reviewed including but not limited to scarring, infection, bleeding, scabbing, incomplete removal, and allergy to anesthesia.
Detail Level: Detailed

## 2023-08-08 NOTE — PROCEDURE: MOHS SURGERY
Pt placed on Legal Hold by this RN on 8/7 @ 1913.  Security contacted regarding belongings search   Melolabial Transposition Flap Text: The defect edges were debeveled with a #15 scalpel blade.  Given the location of the defect and the proximity to free margins a melolabial flap was deemed most appropriate.  Using a sterile surgical marker, an appropriate melolabial transposition flap was drawn incorporating the defect.    The area thus outlined was incised deep to adipose tissue with a #15 scalpel blade.  The skin margins were undermined to an appropriate distance in all directions utilizing iris scissors.